# Patient Record
Sex: MALE | Race: BLACK OR AFRICAN AMERICAN
[De-identification: names, ages, dates, MRNs, and addresses within clinical notes are randomized per-mention and may not be internally consistent; named-entity substitution may affect disease eponyms.]

---

## 2017-01-17 PROBLEM — M54.5 LUMBAGO: Status: ACTIVE | Noted: 2017-01-17

## 2017-01-18 ENCOUNTER — APPOINTMENT (OUTPATIENT)
Dept: ORTHOPEDIC SURGERY | Facility: CLINIC | Age: 57
End: 2017-01-18

## 2017-01-18 DIAGNOSIS — M54.5 LOW BACK PAIN: ICD-10-CM

## 2017-12-01 ENCOUNTER — EMERGENCY (EMERGENCY)
Facility: HOSPITAL | Age: 57
LOS: 0 days | Discharge: HOME | End: 2017-12-01
Attending: EMERGENCY MEDICINE | Admitting: EMERGENCY MEDICINE
Payer: MEDICAID

## 2017-12-01 VITALS
DIASTOLIC BLOOD PRESSURE: 86 MMHG | RESPIRATION RATE: 19 BRPM | SYSTOLIC BLOOD PRESSURE: 188 MMHG | OXYGEN SATURATION: 100 % | HEART RATE: 75 BPM | TEMPERATURE: 97 F

## 2017-12-01 VITALS — WEIGHT: 175.05 LBS | HEIGHT: 69 IN

## 2017-12-01 DIAGNOSIS — M79.89 OTHER SPECIFIED SOFT TISSUE DISORDERS: ICD-10-CM

## 2017-12-01 DIAGNOSIS — I10 ESSENTIAL (PRIMARY) HYPERTENSION: ICD-10-CM

## 2017-12-01 DIAGNOSIS — E78.5 HYPERLIPIDEMIA, UNSPECIFIED: ICD-10-CM

## 2017-12-01 DIAGNOSIS — M79.622 PAIN IN LEFT UPPER ARM: ICD-10-CM

## 2017-12-01 DIAGNOSIS — M25.512 PAIN IN LEFT SHOULDER: ICD-10-CM

## 2017-12-01 DIAGNOSIS — R60.9 EDEMA, UNSPECIFIED: ICD-10-CM

## 2017-12-01 DIAGNOSIS — M79.602 PAIN IN LEFT ARM: ICD-10-CM

## 2017-12-01 LAB
ALBUMIN SERPL ELPH-MCNC: 3.7 G/DL — SIGNIFICANT CHANGE UP (ref 3.3–5)
ALP SERPL-CCNC: 110 U/L — SIGNIFICANT CHANGE UP (ref 40–120)
ALT FLD-CCNC: 40 U/L — SIGNIFICANT CHANGE UP (ref 12–78)
ANION GAP SERPL CALC-SCNC: 6 MMOL/L — SIGNIFICANT CHANGE UP (ref 5–17)
APTT BLD: 32.5 SEC — SIGNIFICANT CHANGE UP (ref 27.5–37.4)
AST SERPL-CCNC: 22 U/L — SIGNIFICANT CHANGE UP (ref 15–37)
BASOPHILS # BLD AUTO: 0.1 K/UL — SIGNIFICANT CHANGE UP (ref 0–0.2)
BASOPHILS NFR BLD AUTO: 0.8 % — SIGNIFICANT CHANGE UP (ref 0–2)
BILIRUB SERPL-MCNC: 0.6 MG/DL — SIGNIFICANT CHANGE UP (ref 0.2–1.2)
BUN SERPL-MCNC: 8 MG/DL — SIGNIFICANT CHANGE UP (ref 7–23)
CALCIUM SERPL-MCNC: 9.2 MG/DL — SIGNIFICANT CHANGE UP (ref 8.5–10.1)
CHLORIDE SERPL-SCNC: 106 MMOL/L — SIGNIFICANT CHANGE UP (ref 96–108)
CO2 SERPL-SCNC: 27 MMOL/L — SIGNIFICANT CHANGE UP (ref 22–31)
CREAT SERPL-MCNC: 1.11 MG/DL — SIGNIFICANT CHANGE UP (ref 0.5–1.3)
EOSINOPHIL # BLD AUTO: 0.1 K/UL — SIGNIFICANT CHANGE UP (ref 0–0.5)
EOSINOPHIL NFR BLD AUTO: 2 % — SIGNIFICANT CHANGE UP (ref 0–6)
GLUCOSE SERPL-MCNC: 111 MG/DL — HIGH (ref 70–99)
HCT VFR BLD CALC: 39.7 % — SIGNIFICANT CHANGE UP (ref 39–50)
HGB BLD-MCNC: 12.9 G/DL — LOW (ref 13–17)
INR BLD: 1.1 RATIO — SIGNIFICANT CHANGE UP (ref 0.88–1.16)
LACTATE SERPL-SCNC: 1 MMOL/L — SIGNIFICANT CHANGE UP (ref 0.7–2)
LYMPHOCYTES # BLD AUTO: 1.6 K/UL — SIGNIFICANT CHANGE UP (ref 1–3.3)
LYMPHOCYTES # BLD AUTO: 21.1 % — SIGNIFICANT CHANGE UP (ref 13–44)
MCHC RBC-ENTMCNC: 29.1 PG — SIGNIFICANT CHANGE UP (ref 27–34)
MCHC RBC-ENTMCNC: 32.6 GM/DL — SIGNIFICANT CHANGE UP (ref 32–36)
MCV RBC AUTO: 89.5 FL — SIGNIFICANT CHANGE UP (ref 80–100)
MONOCYTES # BLD AUTO: 0.7 K/UL — SIGNIFICANT CHANGE UP (ref 0–0.9)
MONOCYTES NFR BLD AUTO: 9.4 % — SIGNIFICANT CHANGE UP (ref 2–14)
NEUTROPHILS # BLD AUTO: 5 K/UL — SIGNIFICANT CHANGE UP (ref 1.8–7.4)
NEUTROPHILS NFR BLD AUTO: 66.7 % — SIGNIFICANT CHANGE UP (ref 43–77)
PLATELET # BLD AUTO: 196 K/UL — SIGNIFICANT CHANGE UP (ref 150–400)
POTASSIUM SERPL-MCNC: 4 MMOL/L — SIGNIFICANT CHANGE UP (ref 3.5–5.3)
POTASSIUM SERPL-SCNC: 4 MMOL/L — SIGNIFICANT CHANGE UP (ref 3.5–5.3)
PROT SERPL-MCNC: 7.5 GM/DL — SIGNIFICANT CHANGE UP (ref 6–8.3)
PROTHROM AB SERPL-ACNC: 11.9 SEC — SIGNIFICANT CHANGE UP (ref 9.8–12.7)
RBC # BLD: 4.44 M/UL — SIGNIFICANT CHANGE UP (ref 4.2–5.8)
RBC # FLD: 12.4 % — SIGNIFICANT CHANGE UP (ref 10.3–14.5)
SODIUM SERPL-SCNC: 139 MMOL/L — SIGNIFICANT CHANGE UP (ref 135–145)
WBC # BLD: 7.5 K/UL — SIGNIFICANT CHANGE UP (ref 3.8–10.5)
WBC # FLD AUTO: 7.5 K/UL — SIGNIFICANT CHANGE UP (ref 3.8–10.5)

## 2017-12-01 PROCEDURE — 93971 EXTREMITY STUDY: CPT | Mod: 26,LT

## 2017-12-01 PROCEDURE — 99285 EMERGENCY DEPT VISIT HI MDM: CPT | Mod: 25

## 2017-12-01 PROCEDURE — 73030 X-RAY EXAM OF SHOULDER: CPT | Mod: 26,LT

## 2017-12-01 RX ORDER — VANCOMYCIN HCL 1 G
1000 VIAL (EA) INTRAVENOUS ONCE
Qty: 0 | Refills: 0 | Status: COMPLETED | OUTPATIENT
Start: 2017-12-01 | End: 2017-12-01

## 2017-12-01 RX ORDER — SODIUM CHLORIDE 9 MG/ML
1000 INJECTION INTRAMUSCULAR; INTRAVENOUS; SUBCUTANEOUS ONCE
Qty: 0 | Refills: 0 | Status: COMPLETED | OUTPATIENT
Start: 2017-12-01 | End: 2017-12-01

## 2017-12-01 RX ORDER — CEPHALEXIN 500 MG
1 CAPSULE ORAL
Qty: 40 | Refills: 0
Start: 2017-12-01 | End: 2017-12-11

## 2017-12-01 RX ADMIN — Medication 250 MILLIGRAM(S): at 12:05

## 2017-12-01 RX ADMIN — SODIUM CHLORIDE 2000 MILLILITER(S): 9 INJECTION INTRAMUSCULAR; INTRAVENOUS; SUBCUTANEOUS at 11:09

## 2017-12-01 NOTE — ED ADULT NURSE REASSESSMENT NOTE - NS ED NURSE REASSESS COMMENT FT1
received pt from KEIKO Daigle.pt is resting in bed comfortably at this time
pt is awaiting dispo. Dr. Pacheco notified.

## 2017-12-01 NOTE — ED PROVIDER NOTE - MEDICAL DECISION MAKING DETAILS
57M pmhx HTN, HLD presents to ED c/o left upper extremity edema and pain. Plan IV fluids, IV abx, shoulder x-ray, reassess.
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
## 2017-12-01 NOTE — CONSULT NOTE ADULT - PROBLEM SELECTOR RECOMMENDATION 9
Likely 2/2 abrasion with normal healing, unlikely cellulitis   -no leukocytosis, fevers, significant erythema or warmth  -swelling likely post-tramautic, unlikely infectious in origin  -no apparent fractures on x-ray  -recommend oral antibiotic (keflex an option), normal wound care  -stable for discharge from ED, follow-up with PCP within 1 week. Return if symptoms worsen.
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
## 2017-12-01 NOTE — ED PROVIDER NOTE - PROGRESS NOTE DETAILS
Hospitalist would like a LUE duplex which is pending. Hospitalist would Hospitalist has seen and consulted on the pt.  If US is negative, can discharge with abx with f/u.

## 2017-12-06 LAB
CULTURE RESULTS: SIGNIFICANT CHANGE UP
CULTURE RESULTS: SIGNIFICANT CHANGE UP
SPECIMEN SOURCE: SIGNIFICANT CHANGE UP
SPECIMEN SOURCE: SIGNIFICANT CHANGE UP

## 2018-11-27 ENCOUNTER — HOSPITAL ENCOUNTER (EMERGENCY)
Age: 58
Discharge: HOME | End: 2018-11-27
Attending: EMERGENCY MEDICINE
Payer: MEDICAID

## 2018-11-27 ENCOUNTER — APPOINTMENT (OUTPATIENT)
Dept: ULTRASOUND IMAGING | Age: 58
End: 2018-11-27
Attending: PHYSICIAN ASSISTANT
Payer: MEDICAID

## 2018-11-27 VITALS
HEIGHT: 70 IN | OXYGEN SATURATION: 96 % | DIASTOLIC BLOOD PRESSURE: 84 MMHG | RESPIRATION RATE: 18 BRPM | TEMPERATURE: 98.2 F | BODY MASS INDEX: 25.48 KG/M2 | WEIGHT: 178 LBS | HEART RATE: 88 BPM | SYSTOLIC BLOOD PRESSURE: 170 MMHG

## 2018-11-27 DIAGNOSIS — N45.3 EPIDIDYMOORCHITIS: Primary | ICD-10-CM

## 2018-11-27 DIAGNOSIS — N50.811 TESTICULAR PAIN, RIGHT: ICD-10-CM

## 2018-11-27 LAB
APPEARANCE UR: CLEAR
BILIRUB UR QL: NEGATIVE
COLOR UR: YELLOW
GLUCOSE UR STRIP.AUTO-MCNC: NEGATIVE MG/DL
HGB UR QL STRIP: NEGATIVE
KETONES UR QL STRIP.AUTO: ABNORMAL MG/DL
LEUKOCYTE ESTERASE UR QL STRIP.AUTO: ABNORMAL
NITRITE UR QL STRIP.AUTO: NEGATIVE
PH UR STRIP: 6.5 [PH] (ref 5–8)
PROT UR STRIP-MCNC: NEGATIVE MG/DL
RBC #/AREA URNS HPF: NORMAL /HPF (ref 0–5)
SP GR UR REFRACTOMETRY: 1.03 (ref 1–1.03)
UROBILINOGEN UR QL STRIP.AUTO: 1 EU/DL (ref 0.2–1)
WBC URNS QL MICRO: NORMAL /HPF (ref 0–4)

## 2018-11-27 PROCEDURE — 76870 US EXAM SCROTUM: CPT

## 2018-11-27 PROCEDURE — 74011250636 HC RX REV CODE- 250/636: Performed by: PHYSICIAN ASSISTANT

## 2018-11-27 PROCEDURE — 81001 URINALYSIS AUTO W/SCOPE: CPT

## 2018-11-27 PROCEDURE — 99284 EMERGENCY DEPT VISIT MOD MDM: CPT

## 2018-11-27 PROCEDURE — 96372 THER/PROPH/DIAG INJ SC/IM: CPT

## 2018-11-27 PROCEDURE — 87491 CHLMYD TRACH DNA AMP PROBE: CPT

## 2018-11-27 RX ORDER — KETOROLAC TROMETHAMINE 30 MG/ML
60 INJECTION, SOLUTION INTRAMUSCULAR; INTRAVENOUS
Status: COMPLETED | OUTPATIENT
Start: 2018-11-27 | End: 2018-11-27

## 2018-11-27 RX ORDER — DOXYCYCLINE HYCLATE 100 MG
100 TABLET ORAL 2 TIMES DAILY
Qty: 28 TAB | Refills: 0 | Status: SHIPPED | OUTPATIENT
Start: 2018-11-27 | End: 2018-12-11

## 2018-11-27 RX ORDER — NAPROXEN 500 MG/1
500 TABLET ORAL 2 TIMES DAILY WITH MEALS
Qty: 20 TAB | Refills: 0 | Status: SHIPPED | OUTPATIENT
Start: 2018-11-27 | End: 2018-12-07

## 2018-11-27 RX ADMIN — LIDOCAINE HYDROCHLORIDE 250 MG: 10 INJECTION, SOLUTION EPIDURAL; INFILTRATION; INTRACAUDAL; PERINEURAL at 17:28

## 2018-11-27 RX ADMIN — KETOROLAC TROMETHAMINE 60 MG: 30 INJECTION, SOLUTION INTRAMUSCULAR at 16:31
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
## 2018-11-27 NOTE — DISCHARGE INSTRUCTIONS
Epididymitis and Orchitis: Care Instructions  Your Care Instructions    Epididymitis is pain and swelling of the tube that is attached to each testicle. This tube is called the epididymis. Orchitis is pain and swelling of the testicle. Infection with bacteria often causes these conditions. Sexually transmitted infections (STIs) also can cause both conditions. This is often the case in men younger than 28. Other causes in boys and older men are infections from surgery or having a catheter that drains urine. The mumps virus also can cause orchitis. Anti-inflammatory or pain medicines can help with the pain. Antibiotics are used if the problem is caused by bacteria. They are not used if a virus is the cause. Your testicle may stay swollen for many days or even a few weeks. The doctor has checked you carefully, but problems can develop later. If you notice any problems or new symptoms, get medical treatment right away. Follow-up care is a key part of your treatment and safety. Be sure to make and go to all appointments, and call your doctor if you are having problems. It's also a good idea to know your test results and keep a list of the medicines you take. How can you care for yourself at home? · If your doctor prescribed antibiotics, take them as directed. Do not stop taking them just because you feel better. You need to take the full course of antibiotics. · Ask your doctor if you can take an over-the-counter pain medicine, such as acetaminophen (Tylenol), ibuprofen (Advil, Motrin), or naproxen (Aleve). Be safe with medicines. Read and follow all instructions on the label. · Limit your activity to what is comfortable. · Wear snug underwear or an athletic supporter. This can help reduce pain. · Apply either cold or heat to the swollen area. Use the one that works best for your pain. Sitting in a warm bath for 15 minutes twice a day will help reduce the swelling more quickly.   · If you have been told that an STI may have caused your condition, do not have sex until your doctor says it is safe. This will prevent spreading the infection. Tell your sex partner or partners that they need to be checked. They may need treatment. When should you call for help? Call your doctor now or seek immediate medical care if:    · Your pain gets worse.     · You have a new or higher fever.     · You have new or more swelling of your testicle.     · You have new belly pain, or your pain gets worse.    Watch closely for changes in your health, and be sure to contact your doctor if:    · You do not get better as expected. Where can you learn more? Go to http://luciano-nicola.info/. Enter S360 in the search box to learn more about \"Epididymitis and Orchitis: Care Instructions. \"  Current as of: March 21, 2018  Content Version: 11.8  © 4312-2185 [employer]. Care instructions adapted under license by [employer] (which disclaims liability or warranty for this information). If you have questions about a medical condition or this instruction, always ask your healthcare professional. [place] [address] any warranty or liability for your use of this information. Testicular Pain: Care Instructions  Your Care Instructions    Pain in the testicles can be caused by many things. These include an injury to your testicles, an infection, and testicular torsion. Injuries and genital problems most often happen during sports or recreational activities, at work, or in a fall. Pain caused by an injury usually goes away quickly. There is usually no long-term harm to your testicles. Infections that may cause pain include:  · An infection of the testicles. This is called orchitis. · An abscess in the scrotum or testicles. · Some sexually transmitted infections (STIs). · A swelling of the tube attached to a testicle. This swelling is called epididymitis.  It can cause pain and is sometimes caused by an infection. Testicular torsion happens when a testicle twists on the spermatic cord. This cuts off the blood supply to the testicle. This is a serious condition that requires surgery. Follow-up care is a key part of your treatment and safety. Be sure to make and go to all appointments, and call your doctor if you are having problems. It's also a good idea to know your test results and keep a list of the medicines you take. How can you care for yourself at home? · Rest and protect your testicles and groin. Stop, change, or take a break from any activity that may be causing your pain or soreness. · Put ice or a cold pack on the area for 10 to 20 minutes at a time. Put a thin cloth between the ice and your skin. · Wear briefs, not boxers. Briefs help support the injured area. You can use a jock strap if it helps relieve your pain. · If your doctor prescribed antibiotics, take them as directed. Do not stop taking them just because you feel better. You need to take the full course of antibiotics. · Ask your doctor if you can take an over-the-counter pain medicine, such as acetaminophen (Tylenol), ibuprofen (Advil, Motrin), or naproxen (Aleve). Be safe with medicines. Read and follow all instructions on the label. · If the doctor gave you a prescription medicine for pain, take it as prescribed. When should you call for help? Call your doctor now or seek immediate medical care if:    · You have severe or increasing pain.     · You notice a change in how your testicles look or are positioned in your scrotum.     · You notice new or worse swelling in your scrotum.     · You have symptoms of a urinary problem, such as a urinary tract infection. These may include:  ? Pain or burning when you urinate. ? A frequent need to urinate without being able to pass much urine. ? Pain in the flank, which is just below the rib cage and above the waist on either side of the back. ?  Blood in your urine.  ? A fever.    Watch closely for changes in your health, and be sure to contact your doctor if:    · You do not get better as expected. Where can you learn more? Go to http://luciano-nicola.info/. Enter M863 in the search box to learn more about \"Testicular Pain: Care Instructions. \"  Current as of: March 21, 2018  Content Version: 11.8  © 0359-6290 [employer]. Care instructions adapted under license by [employer] (which disclaims liability or warranty for this information). If you have questions about a medical condition or this instruction, always ask your healthcare professional. [address] any warranty or liability for your use of this information.

## 2018-11-27 NOTE — ED NOTES
Current Discharge Medication List  
  
START taking these medications Details  
doxycycline (VIBRA-TABS) 100 mg tablet Take 1 Tab by mouth two (2) times a day for 14 days. Qty: 28 Tab, Refills: 0  
  
naproxen (NAPROSYN) 500 mg tablet Take 1 Tab by mouth two (2) times daily (with meals) for 10 days. Qty: 20 Tab, Refills: 0 Patient armband removed and shredded. Prescriptions given and reviewed with patient.
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
## 2018-11-27 NOTE — ED NOTES
I performed a brief evaluation, including history and physical, of the patient here in triage and I have determined that pt will need further treatment and evaluation from the main side ER physician. I have placed initial orders to help in expediting patients care. November 27, 2018 at 3:07 PM - [place], [place] Visit Vitals /86 (BP 1 Location: Left arm) Pulse 88 Temp 98.2 °F (36.8 °C) Resp 18 Ht 5' 9.5\" (1.765 m) Wt 80.7 kg (178 lb) SpO2 96% BMI 25.91 kg/m²

## 2018-11-28 LAB
C TRACH RRNA SPEC QL NAA+PROBE: NEGATIVE
N GONORRHOEA RRNA SPEC QL NAA+PROBE: NEGATIVE
SPECIMEN SOURCE: NORMAL

## 2019-12-04 ENCOUNTER — EMERGENCY (EMERGENCY)
Facility: HOSPITAL | Age: 59
LOS: 0 days | Discharge: HOME | End: 2019-12-04
Attending: EMERGENCY MEDICINE
Payer: MEDICAID

## 2019-12-04 VITALS — WEIGHT: 175.05 LBS | HEIGHT: 69 IN

## 2019-12-04 VITALS
OXYGEN SATURATION: 96 % | HEART RATE: 64 BPM | RESPIRATION RATE: 18 BRPM | SYSTOLIC BLOOD PRESSURE: 172 MMHG | TEMPERATURE: 98 F | DIASTOLIC BLOOD PRESSURE: 91 MMHG

## 2019-12-04 DIAGNOSIS — E78.5 HYPERLIPIDEMIA, UNSPECIFIED: ICD-10-CM

## 2019-12-04 DIAGNOSIS — R10.31 RIGHT LOWER QUADRANT PAIN: ICD-10-CM

## 2019-12-04 DIAGNOSIS — R10.11 RIGHT UPPER QUADRANT PAIN: ICD-10-CM

## 2019-12-04 DIAGNOSIS — Z98.890 OTHER SPECIFIED POSTPROCEDURAL STATES: Chronic | ICD-10-CM

## 2019-12-04 DIAGNOSIS — I10 ESSENTIAL (PRIMARY) HYPERTENSION: ICD-10-CM

## 2019-12-04 LAB
ALBUMIN SERPL ELPH-MCNC: 3.8 G/DL — SIGNIFICANT CHANGE UP (ref 3.3–5)
ALP SERPL-CCNC: 89 U/L — SIGNIFICANT CHANGE UP (ref 40–120)
ALT FLD-CCNC: 38 U/L — SIGNIFICANT CHANGE UP (ref 12–78)
ANION GAP SERPL CALC-SCNC: 5 MMOL/L — SIGNIFICANT CHANGE UP (ref 5–17)
APPEARANCE UR: CLEAR — SIGNIFICANT CHANGE UP
APTT BLD: 32.4 SEC — SIGNIFICANT CHANGE UP (ref 27.5–36.3)
AST SERPL-CCNC: 48 U/L — HIGH (ref 15–37)
BASOPHILS # BLD AUTO: 0.03 K/UL — SIGNIFICANT CHANGE UP (ref 0–0.2)
BASOPHILS NFR BLD AUTO: 0.5 % — SIGNIFICANT CHANGE UP (ref 0–2)
BILIRUB SERPL-MCNC: 0.6 MG/DL — SIGNIFICANT CHANGE UP (ref 0.2–1.2)
BILIRUB UR-MCNC: NEGATIVE — SIGNIFICANT CHANGE UP
BUN SERPL-MCNC: 17 MG/DL — SIGNIFICANT CHANGE UP (ref 7–23)
CALCIUM SERPL-MCNC: 8.8 MG/DL — SIGNIFICANT CHANGE UP (ref 8.5–10.1)
CHLORIDE SERPL-SCNC: 110 MMOL/L — HIGH (ref 96–108)
CO2 SERPL-SCNC: 24 MMOL/L — SIGNIFICANT CHANGE UP (ref 22–31)
COLOR SPEC: YELLOW — SIGNIFICANT CHANGE UP
CREAT SERPL-MCNC: 1.35 MG/DL — HIGH (ref 0.5–1.3)
DIFF PNL FLD: NEGATIVE — SIGNIFICANT CHANGE UP
EOSINOPHIL # BLD AUTO: 0.07 K/UL — SIGNIFICANT CHANGE UP (ref 0–0.5)
EOSINOPHIL NFR BLD AUTO: 1.2 % — SIGNIFICANT CHANGE UP (ref 0–6)
GLUCOSE SERPL-MCNC: 90 MG/DL — SIGNIFICANT CHANGE UP (ref 70–99)
GLUCOSE UR QL: NEGATIVE MG/DL — SIGNIFICANT CHANGE UP
HCT VFR BLD CALC: 42.9 % — SIGNIFICANT CHANGE UP (ref 39–50)
HGB BLD-MCNC: 14.3 G/DL — SIGNIFICANT CHANGE UP (ref 13–17)
IMM GRANULOCYTES NFR BLD AUTO: 0.2 % — SIGNIFICANT CHANGE UP (ref 0–1.5)
INR BLD: 1.06 RATIO — SIGNIFICANT CHANGE UP (ref 0.88–1.16)
KETONES UR-MCNC: NEGATIVE — SIGNIFICANT CHANGE UP
LEUKOCYTE ESTERASE UR-ACNC: NEGATIVE — SIGNIFICANT CHANGE UP
LIDOCAIN IGE QN: 102 U/L — SIGNIFICANT CHANGE UP (ref 73–393)
LYMPHOCYTES # BLD AUTO: 1.59 K/UL — SIGNIFICANT CHANGE UP (ref 1–3.3)
LYMPHOCYTES # BLD AUTO: 26.4 % — SIGNIFICANT CHANGE UP (ref 13–44)
MCHC RBC-ENTMCNC: 30.2 PG — SIGNIFICANT CHANGE UP (ref 27–34)
MCHC RBC-ENTMCNC: 33.3 GM/DL — SIGNIFICANT CHANGE UP (ref 32–36)
MCV RBC AUTO: 90.5 FL — SIGNIFICANT CHANGE UP (ref 80–100)
MONOCYTES # BLD AUTO: 0.61 K/UL — SIGNIFICANT CHANGE UP (ref 0–0.9)
MONOCYTES NFR BLD AUTO: 10.1 % — SIGNIFICANT CHANGE UP (ref 2–14)
NEUTROPHILS # BLD AUTO: 3.71 K/UL — SIGNIFICANT CHANGE UP (ref 1.8–7.4)
NEUTROPHILS NFR BLD AUTO: 61.6 % — SIGNIFICANT CHANGE UP (ref 43–77)
NITRITE UR-MCNC: NEGATIVE — SIGNIFICANT CHANGE UP
PH UR: 5 — SIGNIFICANT CHANGE UP (ref 5–8)
PLATELET # BLD AUTO: 252 K/UL — SIGNIFICANT CHANGE UP (ref 150–400)
POTASSIUM SERPL-MCNC: 4 MMOL/L — SIGNIFICANT CHANGE UP (ref 3.5–5.3)
POTASSIUM SERPL-SCNC: 4 MMOL/L — SIGNIFICANT CHANGE UP (ref 3.5–5.3)
PROT SERPL-MCNC: 7.7 GM/DL — SIGNIFICANT CHANGE UP (ref 6–8.3)
PROT UR-MCNC: 15 MG/DL
PROTHROM AB SERPL-ACNC: 11.8 SEC — SIGNIFICANT CHANGE UP (ref 10–12.9)
RBC # BLD: 4.74 M/UL — SIGNIFICANT CHANGE UP (ref 4.2–5.8)
RBC # FLD: 13.4 % — SIGNIFICANT CHANGE UP (ref 10.3–14.5)
SODIUM SERPL-SCNC: 139 MMOL/L — SIGNIFICANT CHANGE UP (ref 135–145)
SP GR SPEC: 1.02 — SIGNIFICANT CHANGE UP (ref 1.01–1.02)
UROBILINOGEN FLD QL: NEGATIVE MG/DL — SIGNIFICANT CHANGE UP
WBC # BLD: 6.02 K/UL — SIGNIFICANT CHANGE UP (ref 3.8–10.5)
WBC # FLD AUTO: 6.02 K/UL — SIGNIFICANT CHANGE UP (ref 3.8–10.5)

## 2019-12-04 PROCEDURE — 36415 COLL VENOUS BLD VENIPUNCTURE: CPT

## 2019-12-04 PROCEDURE — 85730 THROMBOPLASTIN TIME PARTIAL: CPT

## 2019-12-04 PROCEDURE — 86900 BLOOD TYPING SEROLOGIC ABO: CPT

## 2019-12-04 PROCEDURE — 83690 ASSAY OF LIPASE: CPT

## 2019-12-04 PROCEDURE — 96375 TX/PRO/DX INJ NEW DRUG ADDON: CPT

## 2019-12-04 PROCEDURE — 86850 RBC ANTIBODY SCREEN: CPT

## 2019-12-04 PROCEDURE — 80053 COMPREHEN METABOLIC PANEL: CPT

## 2019-12-04 PROCEDURE — 81001 URINALYSIS AUTO W/SCOPE: CPT

## 2019-12-04 PROCEDURE — 99284 EMERGENCY DEPT VISIT MOD MDM: CPT

## 2019-12-04 PROCEDURE — 85025 COMPLETE CBC W/AUTO DIFF WBC: CPT

## 2019-12-04 PROCEDURE — 87086 URINE CULTURE/COLONY COUNT: CPT

## 2019-12-04 PROCEDURE — 74177 CT ABD & PELVIS W/CONTRAST: CPT

## 2019-12-04 PROCEDURE — 96374 THER/PROPH/DIAG INJ IV PUSH: CPT | Mod: XU

## 2019-12-04 PROCEDURE — 99284 EMERGENCY DEPT VISIT MOD MDM: CPT | Mod: 25

## 2019-12-04 PROCEDURE — 86901 BLOOD TYPING SEROLOGIC RH(D): CPT

## 2019-12-04 PROCEDURE — 74177 CT ABD & PELVIS W/CONTRAST: CPT | Mod: 26

## 2019-12-04 PROCEDURE — 85610 PROTHROMBIN TIME: CPT

## 2019-12-04 RX ORDER — SODIUM CHLORIDE 9 MG/ML
1000 INJECTION INTRAMUSCULAR; INTRAVENOUS; SUBCUTANEOUS ONCE
Refills: 0 | Status: COMPLETED | OUTPATIENT
Start: 2019-12-04 | End: 2019-12-04

## 2019-12-04 RX ORDER — ONDANSETRON 8 MG/1
4 TABLET, FILM COATED ORAL ONCE
Refills: 0 | Status: COMPLETED | OUTPATIENT
Start: 2019-12-04 | End: 2019-12-04

## 2019-12-04 RX ORDER — MORPHINE SULFATE 50 MG/1
4 CAPSULE, EXTENDED RELEASE ORAL ONCE
Refills: 0 | Status: DISCONTINUED | OUTPATIENT
Start: 2019-12-04 | End: 2019-12-04

## 2019-12-04 RX ADMIN — MORPHINE SULFATE 4 MILLIGRAM(S): 50 CAPSULE, EXTENDED RELEASE ORAL at 12:57

## 2019-12-04 RX ADMIN — ONDANSETRON 4 MILLIGRAM(S): 8 TABLET, FILM COATED ORAL at 12:57

## 2019-12-04 RX ADMIN — SODIUM CHLORIDE 1000 MILLILITER(S): 9 INJECTION INTRAMUSCULAR; INTRAVENOUS; SUBCUTANEOUS at 12:57

## 2019-12-04 NOTE — ED STATDOCS - PROGRESS NOTE DETAILS
Patient initially seen and evaluated with ED attending at intake.  Reporting abdominal pain intermittent for a few weeks, worse today, he is visiting from [facility].  No acute findings on workup.  REviewed PMD f/u, GI follow up and return precautions -Hermann Ngo PA-C

## 2019-12-04 NOTE — ED STATDOCS - CLINICAL SUMMARY MEDICAL DECISION MAKING FREE TEXT BOX
patient with abdominal pain, labs, CT, urine    No acute findings, possibly muscle strain, recommended GI f/u

## 2019-12-04 NOTE — ED ADULT NURSE NOTE - OBJECTIVE STATEMENT
Pt presents to ED c/o right sided abd pain starting a few weeks intermittently worse over the last few weeks, constantly for the last 2 days. Pt denies N/V/D, fever. Pt reports last BM this morning.

## 2019-12-04 NOTE — ED ADULT NURSE NOTE - NSIMPLEMENTINTERV_GEN_ALL_ED
Implemented All Universal Safety Interventions:  [place] to call system. Call bell, personal items and telephone within reach. Instruct patient to call for assistance. Room bathroom lighting operational. Non-slip footwear when patient is off stretcher. Physically safe environment: no spills, clutter or unnecessary equipment. Stretcher in lowest position, wheels locked, appropriate side rails in place.

## 2019-12-04 NOTE — ED STATDOCS - CARE PROVIDER_API CALL
Roberto Blackmon)  Gastroenterology  [address] [place]  [place] [address]  Phone: [phone]  Fax: [phone]  Follow Up Time:

## 2019-12-04 NOTE — ED STATDOCS - GASTROINTESTINAL, MLM
abdomen soft, and non-distended. Bowel sounds present. +RLQ, suprapubic, and LLQ TTP. +voluntary guarding with pain.

## 2019-12-04 NOTE — ED STATDOCS - PATIENT PORTAL LINK FT
You can access the FollowMyHealth Patient Portal offered by [facility] by registering at the following website: [email or web address]. By joining [facility]’s FollowMyHealth portal, you will also be able to view your health information using other applications (apps) compatible with our system.

## 2019-12-04 NOTE — ED STATDOCS - OBJECTIVE STATEMENT
58 y/o male with a PMHx of HTN, HLD, "back surgery" presents to the ED c/o right sided abd pain for weeks, described as pressure. Denies fever, chills, dysuria. Not worse with eating. No h/o abdominal surgeries. NKDA. 60 y/o male with a PMHx of HTN, HLD, "back surgery" presents to the ED c/o right sided abd pain for weeks, described as pressure. Denies fever, chills, dysuria, n/v/d. Not worse with eating. No h/o abdominal surgeries. NKDA.

## 2019-12-06 LAB
CULTURE RESULTS: SIGNIFICANT CHANGE UP
SPECIMEN SOURCE: SIGNIFICANT CHANGE UP

## 2019-12-09 ENCOUNTER — EMERGENCY (EMERGENCY)
Facility: HOSPITAL | Age: 59
LOS: 0 days | Discharge: HOME | End: 2019-12-09
Attending: EMERGENCY MEDICINE
Payer: MEDICAID

## 2019-12-09 VITALS
DIASTOLIC BLOOD PRESSURE: 87 MMHG | HEIGHT: 69 IN | TEMPERATURE: 98 F | OXYGEN SATURATION: 98 % | SYSTOLIC BLOOD PRESSURE: 168 MMHG | WEIGHT: 175.05 LBS | RESPIRATION RATE: 16 BRPM | HEART RATE: 65 BPM

## 2019-12-09 VITALS
RESPIRATION RATE: 17 BRPM | OXYGEN SATURATION: 99 % | SYSTOLIC BLOOD PRESSURE: 145 MMHG | HEART RATE: 66 BPM | DIASTOLIC BLOOD PRESSURE: 68 MMHG | TEMPERATURE: 98 F

## 2019-12-09 DIAGNOSIS — N34.2 OTHER URETHRITIS: ICD-10-CM

## 2019-12-09 DIAGNOSIS — E78.5 HYPERLIPIDEMIA, UNSPECIFIED: ICD-10-CM

## 2019-12-09 DIAGNOSIS — R10.30 LOWER ABDOMINAL PAIN, UNSPECIFIED: ICD-10-CM

## 2019-12-09 DIAGNOSIS — F17.200 NICOTINE DEPENDENCE, UNSPECIFIED, UNCOMPLICATED: ICD-10-CM

## 2019-12-09 DIAGNOSIS — I10 ESSENTIAL (PRIMARY) HYPERTENSION: ICD-10-CM

## 2019-12-09 DIAGNOSIS — Z98.890 OTHER SPECIFIED POSTPROCEDURAL STATES: Chronic | ICD-10-CM

## 2019-12-09 LAB
ALBUMIN SERPL ELPH-MCNC: 3.5 G/DL — SIGNIFICANT CHANGE UP (ref 3.3–5)
ALP SERPL-CCNC: 85 U/L — SIGNIFICANT CHANGE UP (ref 40–120)
ALT FLD-CCNC: 36 U/L — SIGNIFICANT CHANGE UP (ref 12–78)
ANION GAP SERPL CALC-SCNC: 6 MMOL/L — SIGNIFICANT CHANGE UP (ref 5–17)
APPEARANCE UR: CLEAR — SIGNIFICANT CHANGE UP
AST SERPL-CCNC: 30 U/L — SIGNIFICANT CHANGE UP (ref 15–37)
BASOPHILS # BLD AUTO: 0.02 K/UL — SIGNIFICANT CHANGE UP (ref 0–0.2)
BASOPHILS NFR BLD AUTO: 0.5 % — SIGNIFICANT CHANGE UP (ref 0–2)
BILIRUB SERPL-MCNC: 0.8 MG/DL — SIGNIFICANT CHANGE UP (ref 0.2–1.2)
BILIRUB UR-MCNC: NEGATIVE — SIGNIFICANT CHANGE UP
BUN SERPL-MCNC: 8 MG/DL — SIGNIFICANT CHANGE UP (ref 7–23)
CALCIUM SERPL-MCNC: 9.6 MG/DL — SIGNIFICANT CHANGE UP (ref 8.5–10.1)
CHLORIDE SERPL-SCNC: 106 MMOL/L — SIGNIFICANT CHANGE UP (ref 96–108)
CO2 SERPL-SCNC: 25 MMOL/L — SIGNIFICANT CHANGE UP (ref 22–31)
COLOR SPEC: YELLOW — SIGNIFICANT CHANGE UP
CREAT SERPL-MCNC: 1.16 MG/DL — SIGNIFICANT CHANGE UP (ref 0.5–1.3)
DIFF PNL FLD: NEGATIVE — SIGNIFICANT CHANGE UP
EOSINOPHIL # BLD AUTO: 0.05 K/UL — SIGNIFICANT CHANGE UP (ref 0–0.5)
EOSINOPHIL NFR BLD AUTO: 1.2 % — SIGNIFICANT CHANGE UP (ref 0–6)
GLUCOSE SERPL-MCNC: 110 MG/DL — HIGH (ref 70–99)
GLUCOSE UR QL: NEGATIVE MG/DL — SIGNIFICANT CHANGE UP
HCT VFR BLD CALC: 43.3 % — SIGNIFICANT CHANGE UP (ref 39–50)
HGB BLD-MCNC: 14.2 G/DL — SIGNIFICANT CHANGE UP (ref 13–17)
HIV 1 & 2 AB SERPL IA.RAPID: SIGNIFICANT CHANGE UP
IMM GRANULOCYTES NFR BLD AUTO: 0.2 % — SIGNIFICANT CHANGE UP (ref 0–1.5)
KETONES UR-MCNC: NEGATIVE — SIGNIFICANT CHANGE UP
LEUKOCYTE ESTERASE UR-ACNC: NEGATIVE — SIGNIFICANT CHANGE UP
LIDOCAIN IGE QN: 96 U/L — SIGNIFICANT CHANGE UP (ref 73–393)
LYMPHOCYTES # BLD AUTO: 0.9 K/UL — LOW (ref 1–3.3)
LYMPHOCYTES # BLD AUTO: 21.2 % — SIGNIFICANT CHANGE UP (ref 13–44)
MCHC RBC-ENTMCNC: 29.6 PG — SIGNIFICANT CHANGE UP (ref 27–34)
MCHC RBC-ENTMCNC: 32.8 GM/DL — SIGNIFICANT CHANGE UP (ref 32–36)
MCV RBC AUTO: 90.2 FL — SIGNIFICANT CHANGE UP (ref 80–100)
MONOCYTES # BLD AUTO: 0.42 K/UL — SIGNIFICANT CHANGE UP (ref 0–0.9)
MONOCYTES NFR BLD AUTO: 9.9 % — SIGNIFICANT CHANGE UP (ref 2–14)
NEUTROPHILS # BLD AUTO: 2.84 K/UL — SIGNIFICANT CHANGE UP (ref 1.8–7.4)
NEUTROPHILS NFR BLD AUTO: 67 % — SIGNIFICANT CHANGE UP (ref 43–77)
NITRITE UR-MCNC: NEGATIVE — SIGNIFICANT CHANGE UP
PH UR: 6 — SIGNIFICANT CHANGE UP (ref 5–8)
PLATELET # BLD AUTO: 210 K/UL — SIGNIFICANT CHANGE UP (ref 150–400)
POTASSIUM SERPL-MCNC: 3.8 MMOL/L — SIGNIFICANT CHANGE UP (ref 3.5–5.3)
POTASSIUM SERPL-SCNC: 3.8 MMOL/L — SIGNIFICANT CHANGE UP (ref 3.5–5.3)
PROT SERPL-MCNC: 7.1 GM/DL — SIGNIFICANT CHANGE UP (ref 6–8.3)
PROT UR-MCNC: NEGATIVE MG/DL — SIGNIFICANT CHANGE UP
RBC # BLD: 4.8 M/UL — SIGNIFICANT CHANGE UP (ref 4.2–5.8)
RBC # FLD: 13.3 % — SIGNIFICANT CHANGE UP (ref 10.3–14.5)
SODIUM SERPL-SCNC: 137 MMOL/L — SIGNIFICANT CHANGE UP (ref 135–145)
SP GR SPEC: 1.01 — SIGNIFICANT CHANGE UP (ref 1.01–1.02)
UROBILINOGEN FLD QL: NEGATIVE MG/DL — SIGNIFICANT CHANGE UP
WBC # BLD: 4.24 K/UL — SIGNIFICANT CHANGE UP (ref 3.8–10.5)
WBC # FLD AUTO: 4.24 K/UL — SIGNIFICANT CHANGE UP (ref 3.8–10.5)

## 2019-12-09 PROCEDURE — 81003 URINALYSIS AUTO W/O SCOPE: CPT

## 2019-12-09 PROCEDURE — 80053 COMPREHEN METABOLIC PANEL: CPT

## 2019-12-09 PROCEDURE — 86703 HIV-1/HIV-2 1 RESULT ANTBDY: CPT

## 2019-12-09 PROCEDURE — 36415 COLL VENOUS BLD VENIPUNCTURE: CPT

## 2019-12-09 PROCEDURE — 96374 THER/PROPH/DIAG INJ IV PUSH: CPT

## 2019-12-09 PROCEDURE — 83690 ASSAY OF LIPASE: CPT

## 2019-12-09 PROCEDURE — 74176 CT ABD & PELVIS W/O CONTRAST: CPT

## 2019-12-09 PROCEDURE — 99284 EMERGENCY DEPT VISIT MOD MDM: CPT

## 2019-12-09 PROCEDURE — 87086 URINE CULTURE/COLONY COUNT: CPT

## 2019-12-09 PROCEDURE — 74176 CT ABD & PELVIS W/O CONTRAST: CPT | Mod: 26

## 2019-12-09 PROCEDURE — 87591 N.GONORRHOEAE DNA AMP PROB: CPT

## 2019-12-09 PROCEDURE — 85025 COMPLETE CBC W/AUTO DIFF WBC: CPT

## 2019-12-09 PROCEDURE — 96372 THER/PROPH/DIAG INJ SC/IM: CPT | Mod: XU

## 2019-12-09 PROCEDURE — 87491 CHLMYD TRACH DNA AMP PROBE: CPT

## 2019-12-09 PROCEDURE — 96375 TX/PRO/DX INJ NEW DRUG ADDON: CPT

## 2019-12-09 PROCEDURE — 99284 EMERGENCY DEPT VISIT MOD MDM: CPT | Mod: 25

## 2019-12-09 RX ORDER — MORPHINE SULFATE 50 MG/1
4 CAPSULE, EXTENDED RELEASE ORAL ONCE
Refills: 0 | Status: DISCONTINUED | OUTPATIENT
Start: 2019-12-09 | End: 2019-12-09

## 2019-12-09 RX ORDER — AZITHROMYCIN 500 MG/1
1000 TABLET, FILM COATED ORAL ONCE
Refills: 0 | Status: COMPLETED | OUTPATIENT
Start: 2019-12-09 | End: 2019-12-09

## 2019-12-09 RX ORDER — CEFTRIAXONE 500 MG/1
250 INJECTION, POWDER, FOR SOLUTION INTRAMUSCULAR; INTRAVENOUS ONCE
Refills: 0 | Status: COMPLETED | OUTPATIENT
Start: 2019-12-09 | End: 2019-12-09

## 2019-12-09 RX ORDER — SODIUM CHLORIDE 9 MG/ML
1000 INJECTION INTRAMUSCULAR; INTRAVENOUS; SUBCUTANEOUS ONCE
Refills: 0 | Status: COMPLETED | OUTPATIENT
Start: 2019-12-09 | End: 2019-12-09

## 2019-12-09 RX ORDER — ONDANSETRON 8 MG/1
4 TABLET, FILM COATED ORAL ONCE
Refills: 0 | Status: COMPLETED | OUTPATIENT
Start: 2019-12-09 | End: 2019-12-09

## 2019-12-09 RX ADMIN — ONDANSETRON 4 MILLIGRAM(S): 8 TABLET, FILM COATED ORAL at 11:16

## 2019-12-09 RX ADMIN — AZITHROMYCIN 1000 MILLIGRAM(S): 500 TABLET, FILM COATED ORAL at 13:51

## 2019-12-09 RX ADMIN — SODIUM CHLORIDE 1000 MILLILITER(S): 9 INJECTION INTRAMUSCULAR; INTRAVENOUS; SUBCUTANEOUS at 10:56

## 2019-12-09 RX ADMIN — MORPHINE SULFATE 4 MILLIGRAM(S): 50 CAPSULE, EXTENDED RELEASE ORAL at 11:14

## 2019-12-09 RX ADMIN — CEFTRIAXONE 250 MILLIGRAM(S): 500 INJECTION, POWDER, FOR SOLUTION INTRAMUSCULAR; INTRAVENOUS at 13:51

## 2019-12-09 RX ADMIN — Medication 100 MILLIGRAM(S): at 13:50

## 2019-12-09 NOTE — ED ADULT TRIAGE NOTE - CHIEF COMPLAINT QUOTE
Pt. to the ED BIBA C/O Lower abdominal pain- Denies Chills/Fever, Nausea and Diarrhea. Pt. states he was evaluated last week for Right sided Flank pain and was DC home- Denies Urinary Symptoms.

## 2019-12-09 NOTE — ED PROVIDER NOTE - NSFOLLOWUPINSTRUCTIONS_ED_ALL_ED_FT
Urethritis, Adult     Urethritis is swelling (inflammation) of the urethra. The urethra is the tube that drains urine from the bladder. It is important to get treatment for this condition early. Delayed treatment may lead to complications.  What are the causes?  This condition may be caused by:  Germs that are spread through sexual contact. This is the leading cause of urethritis. This may include bacterial or viral infections.Injury to the urethra. Injury can happen after a thin, flexible tube (catheter) is inserted into the urethra to drain urine, or after medical instruments or foreign bodies are inserted into the area.Chemical irritation. This may include contact with spermicide.A disease that causes inflammation. This is rare.What increases the risk?  The following factors may make you more likely to develop this condition:  Having sex without using a condom.Having multiple sexual partners.Having poor hygiene.What are the signs or symptoms?  Symptoms of this condition include:  Pain with urination.Frequent urination.Urgent need to urinate.Itching and pain in the vagina or penis.Discharge coming from the penis.However, women rarely have symptoms.  How is this diagnosed?  This condition is diagnosed based on your medical history and symptoms as well as a physical exam. Tests may also be done. These may include:  Urine tests.Swabs from the urethra.How is this treated?  Treatment for this condition depends on the cause. Urethritis caused by a bacterial infection is treated with antibiotic medicine. Any sexual partners must also be treated.  Follow these instructions at home:  Medicines     Take over-the-counter and prescription medicines only as told by your health care provider.If you were prescribed antibiotic medicine, take it as told by your health care provider. Do not stop taking the antibiotic even if you start to feel better.Lifestyle     Avoid using perfumed soaps, bubble bath, and shampoo when you bathe or shower. Rinse the vaginal area after bathing.Wear cotton underwear. Not wearing underwear when going to bed can help.Make sure to wipe from front to back after using the toilet if you are female.Do not have sex until your health care provider approves. When you do have sex, be sure to practice safe sex.Tell anyone with whom you have had sexual relations in the past 60 days that he or she may be at risk of infection.General instructions     Drink enough fluid to keep your urine clear or pale yellow.It is up to you to get your test results. Ask your health care provider, or the department that is doing the test, when your results will be ready.Keep all follow-up visits as told by your health care provider. This is important.Get tested again 3 months after treatment to make sure the infection is gone. It is important that your sexual partner also gets tested again.Contact a health care provider if:  Your symptoms have not improved after 3 days.Your symptoms get worse.You have eye redness or pain.You develop abdominal pain or pelvic pain (in females).You develop joint pain.You have a fever.Get help right away if:  You have severe pain in the belly, back, or side.You vomit repeatedly.Summary  Urethritis is a swelling (inflammation) of the urethra.This condition is caused by germs that are spread through sexual contact. This is the main cause of this illness.It is important to get treatment for this condition early. Delayed treatment may lead to complications.Treatment for this condition depends on the cause. Any sexual partners must also be treated.This information is not intended to replace advice given to you by your health care provider. Make sure you discuss any questions you have with your health care provider.    Document Released: 06/13/2002 Document Revised: 01/23/2018 Document Reviewed: 01/23/2018  [employer] Patient Education © 2019 [employer]

## 2019-12-09 NOTE — ED PROVIDER NOTE - PATIENT PORTAL LINK FT
You can access the FollowMyHealth Patient Portal offered by [facility] by registering at the following website: [email or web address]. By joining [employer]’s FollowMyHealth portal, you will also be able to view your health information using other applications (apps) compatible with our system.

## 2019-12-09 NOTE — ED PROVIDER NOTE - OBJECTIVE STATEMENT
60 y/o male with a PMHx of HTN, HLD presents to the ED c/o sharp, intermittent lower abd pain since last night. Pain started after having a hard BM. +dysuria. Denies fever, chills, n/v/d, hematuria. Pt was seen in ED on 12/4/19 for right sided abd pain with no acute findings, was discharged home to follow up with GI and PMD. Pt states his current pain is not the same as last week. Pt states he is on medication for his blood pressure, is only taking half of his prescribed dose because he is running out of his medication and needs to see his PMD for a refill. +smoker. Denies illicit drug use.

## 2019-12-09 NOTE — ED PROVIDER NOTE - PROGRESS NOTE DETAILS
Robert CID for ED attending, Doctor Styles. Discussed with pt treatment of urethritis. Likely chlamydia or gonorrhea, will be treated for both. Pt consented to rapid HIV test. Aware that his partners should be tested as well. Aware that he should either abstain from sex or practice protected sex. Pt will follow up with PMD when he returns to [place] for repeat testing.

## 2019-12-10 LAB
C TRACH RRNA SPEC QL NAA+PROBE: SIGNIFICANT CHANGE UP
CULTURE RESULTS: NO GROWTH — SIGNIFICANT CHANGE UP
N GONORRHOEA RRNA SPEC QL NAA+PROBE: SIGNIFICANT CHANGE UP
SPECIMEN SOURCE: SIGNIFICANT CHANGE UP
SPECIMEN SOURCE: SIGNIFICANT CHANGE UP

## 2020-02-02 ENCOUNTER — INPATIENT (INPATIENT)
Facility: HOSPITAL | Age: 60
LOS: 4 days | Discharge: HOME | DRG: 224 | End: 2020-02-07
Attending: FAMILY MEDICINE | Admitting: FAMILY MEDICINE
Payer: MEDICAID

## 2020-02-02 VITALS — HEIGHT: 69 IN | WEIGHT: 190.04 LBS

## 2020-02-02 DIAGNOSIS — K56.609 UNSPECIFIED INTESTINAL OBSTRUCTION, UNSPECIFIED AS TO PARTIAL VERSUS COMPLETE OBSTRUCTION: ICD-10-CM

## 2020-02-02 DIAGNOSIS — Z98.890 OTHER SPECIFIED POSTPROCEDURAL STATES: Chronic | ICD-10-CM

## 2020-02-02 LAB
ALBUMIN SERPL ELPH-MCNC: 3.6 G/DL — SIGNIFICANT CHANGE UP (ref 3.3–5)
ALP SERPL-CCNC: 84 U/L — SIGNIFICANT CHANGE UP (ref 40–120)
ALT FLD-CCNC: 41 U/L — SIGNIFICANT CHANGE UP (ref 12–78)
ANION GAP SERPL CALC-SCNC: 4 MMOL/L — LOW (ref 5–17)
APTT BLD: 33.6 SEC — SIGNIFICANT CHANGE UP (ref 27.5–36.3)
AST SERPL-CCNC: 30 U/L — SIGNIFICANT CHANGE UP (ref 15–37)
BILIRUB SERPL-MCNC: 0.2 MG/DL — SIGNIFICANT CHANGE UP (ref 0.2–1.2)
BUN SERPL-MCNC: 14 MG/DL — SIGNIFICANT CHANGE UP (ref 7–23)
CALCIUM SERPL-MCNC: 8.9 MG/DL — SIGNIFICANT CHANGE UP (ref 8.5–10.1)
CHLORIDE SERPL-SCNC: 106 MMOL/L — SIGNIFICANT CHANGE UP (ref 96–108)
CO2 SERPL-SCNC: 29 MMOL/L — SIGNIFICANT CHANGE UP (ref 22–31)
CREAT SERPL-MCNC: 1.2 MG/DL — SIGNIFICANT CHANGE UP (ref 0.5–1.3)
GLUCOSE SERPL-MCNC: 105 MG/DL — HIGH (ref 70–99)
HCT VFR BLD CALC: 42.9 % — SIGNIFICANT CHANGE UP (ref 39–50)
HGB BLD-MCNC: 14.3 G/DL — SIGNIFICANT CHANGE UP (ref 13–17)
INR BLD: 1 RATIO — SIGNIFICANT CHANGE UP (ref 0.88–1.16)
LACTATE SERPL-SCNC: 1.7 MMOL/L — SIGNIFICANT CHANGE UP (ref 0.7–2)
LACTATE SERPL-SCNC: 2.4 MMOL/L — HIGH (ref 0.7–2)
MCHC RBC-ENTMCNC: 29.8 PG — SIGNIFICANT CHANGE UP (ref 27–34)
MCHC RBC-ENTMCNC: 33.3 GM/DL — SIGNIFICANT CHANGE UP (ref 32–36)
MCV RBC AUTO: 89.4 FL — SIGNIFICANT CHANGE UP (ref 80–100)
PLATELET # BLD AUTO: 220 K/UL — SIGNIFICANT CHANGE UP (ref 150–400)
POTASSIUM SERPL-MCNC: 3.9 MMOL/L — SIGNIFICANT CHANGE UP (ref 3.5–5.3)
POTASSIUM SERPL-SCNC: 3.9 MMOL/L — SIGNIFICANT CHANGE UP (ref 3.5–5.3)
PROT SERPL-MCNC: 7.4 GM/DL — SIGNIFICANT CHANGE UP (ref 6–8.3)
PROTHROM AB SERPL-ACNC: 11.1 SEC — SIGNIFICANT CHANGE UP (ref 10–12.9)
RBC # BLD: 4.8 M/UL — SIGNIFICANT CHANGE UP (ref 4.2–5.8)
RBC # FLD: 12.9 % — SIGNIFICANT CHANGE UP (ref 10.3–14.5)
SODIUM SERPL-SCNC: 139 MMOL/L — SIGNIFICANT CHANGE UP (ref 135–145)
TROPONIN I SERPL-MCNC: <0.015 NG/ML — SIGNIFICANT CHANGE UP (ref 0.01–0.04)
TROPONIN I SERPL-MCNC: <0.015 NG/ML — SIGNIFICANT CHANGE UP (ref 0.01–0.04)
WBC # BLD: 4.18 K/UL — SIGNIFICANT CHANGE UP (ref 3.8–10.5)
WBC # FLD AUTO: 4.18 K/UL — SIGNIFICANT CHANGE UP (ref 3.8–10.5)

## 2020-02-02 PROCEDURE — 71275 CT ANGIOGRAPHY CHEST: CPT | Mod: 26

## 2020-02-02 PROCEDURE — 74019 RADEX ABDOMEN 2 VIEWS: CPT

## 2020-02-02 PROCEDURE — 99222 1ST HOSP IP/OBS MODERATE 55: CPT

## 2020-02-02 PROCEDURE — 36415 COLL VENOUS BLD VENIPUNCTURE: CPT

## 2020-02-02 PROCEDURE — 86803 HEPATITIS C AB TEST: CPT

## 2020-02-02 PROCEDURE — 83735 ASSAY OF MAGNESIUM: CPT

## 2020-02-02 PROCEDURE — 85027 COMPLETE CBC AUTOMATED: CPT

## 2020-02-02 PROCEDURE — 83605 ASSAY OF LACTIC ACID: CPT

## 2020-02-02 PROCEDURE — 93010 ELECTROCARDIOGRAM REPORT: CPT

## 2020-02-02 PROCEDURE — 93306 TTE W/DOPPLER COMPLETE: CPT

## 2020-02-02 PROCEDURE — 84484 ASSAY OF TROPONIN QUANT: CPT

## 2020-02-02 PROCEDURE — 71045 X-RAY EXAM CHEST 1 VIEW: CPT | Mod: 26,76

## 2020-02-02 PROCEDURE — C9113: CPT

## 2020-02-02 PROCEDURE — 84100 ASSAY OF PHOSPHORUS: CPT

## 2020-02-02 PROCEDURE — 74019 RADEX ABDOMEN 2 VIEWS: CPT | Mod: 26

## 2020-02-02 PROCEDURE — 80048 BASIC METABOLIC PNL TOTAL CA: CPT

## 2020-02-02 PROCEDURE — 88305 TISSUE EXAM BY PATHOLOGIST: CPT

## 2020-02-02 PROCEDURE — 74174 CTA ABD&PLVS W/CONTRAST: CPT | Mod: 26

## 2020-02-02 PROCEDURE — 71045 X-RAY EXAM CHEST 1 VIEW: CPT

## 2020-02-02 PROCEDURE — 80053 COMPREHEN METABOLIC PANEL: CPT

## 2020-02-02 RX ORDER — MORPHINE SULFATE 50 MG/1
4 CAPSULE, EXTENDED RELEASE ORAL ONCE
Refills: 0 | Status: DISCONTINUED | OUTPATIENT
Start: 2020-02-02 | End: 2020-02-02

## 2020-02-02 RX ORDER — ASPIRIN/CALCIUM CARB/MAGNESIUM 324 MG
1 TABLET ORAL
Qty: 0 | Refills: 0 | DISCHARGE

## 2020-02-02 RX ORDER — AMLODIPINE BESYLATE 2.5 MG/1
1 TABLET ORAL
Qty: 0 | Refills: 0 | DISCHARGE

## 2020-02-02 RX ORDER — KETOROLAC TROMETHAMINE 30 MG/ML
15 SYRINGE (ML) INJECTION EVERY 6 HOURS
Refills: 0 | Status: DISCONTINUED | OUTPATIENT
Start: 2020-02-02 | End: 2020-02-03

## 2020-02-02 RX ORDER — LISINOPRIL 2.5 MG/1
10 TABLET ORAL DAILY
Refills: 0 | Status: DISCONTINUED | OUTPATIENT
Start: 2020-02-02 | End: 2020-02-03

## 2020-02-02 RX ORDER — ACETAMINOPHEN 500 MG
1000 TABLET ORAL EVERY 6 HOURS
Refills: 0 | Status: COMPLETED | OUTPATIENT
Start: 2020-02-02 | End: 2020-02-03

## 2020-02-02 RX ORDER — FAMOTIDINE 10 MG/ML
20 INJECTION INTRAVENOUS DAILY
Refills: 0 | Status: DISCONTINUED | OUTPATIENT
Start: 2020-02-02 | End: 2020-02-03

## 2020-02-02 RX ORDER — LISINOPRIL 2.5 MG/1
1 TABLET ORAL
Qty: 0 | Refills: 0 | DISCHARGE

## 2020-02-02 RX ORDER — METOPROLOL TARTRATE 50 MG
25 TABLET ORAL
Refills: 0 | Status: DISCONTINUED | OUTPATIENT
Start: 2020-02-02 | End: 2020-02-03

## 2020-02-02 RX ORDER — ATORVASTATIN CALCIUM 80 MG/1
80 TABLET, FILM COATED ORAL AT BEDTIME
Refills: 0 | Status: DISCONTINUED | OUTPATIENT
Start: 2020-02-02 | End: 2020-02-03

## 2020-02-02 RX ORDER — ENOXAPARIN SODIUM 100 MG/ML
40 INJECTION SUBCUTANEOUS DAILY
Refills: 0 | Status: DISCONTINUED | OUTPATIENT
Start: 2020-02-02 | End: 2020-02-03

## 2020-02-02 RX ORDER — CLOPIDOGREL BISULFATE 75 MG/1
300 TABLET, FILM COATED ORAL ONCE
Refills: 0 | Status: COMPLETED | OUTPATIENT
Start: 2020-02-02 | End: 2020-02-02

## 2020-02-02 RX ORDER — AMLODIPINE BESYLATE 2.5 MG/1
10 TABLET ORAL DAILY
Refills: 0 | Status: DISCONTINUED | OUTPATIENT
Start: 2020-02-02 | End: 2020-02-03

## 2020-02-02 RX ORDER — MORPHINE SULFATE 50 MG/1
4 CAPSULE, EXTENDED RELEASE ORAL EVERY 4 HOURS
Refills: 0 | Status: DISCONTINUED | OUTPATIENT
Start: 2020-02-02 | End: 2020-02-02

## 2020-02-02 RX ORDER — METOPROLOL TARTRATE 50 MG
1 TABLET ORAL
Qty: 0 | Refills: 0 | DISCHARGE

## 2020-02-02 RX ORDER — LABETALOL HCL 100 MG
10 TABLET ORAL ONCE
Refills: 0 | Status: COMPLETED | OUTPATIENT
Start: 2020-02-02 | End: 2020-02-02

## 2020-02-02 RX ORDER — SODIUM CHLORIDE 9 MG/ML
1000 INJECTION, SOLUTION INTRAVENOUS ONCE
Refills: 0 | Status: COMPLETED | OUTPATIENT
Start: 2020-02-02 | End: 2020-02-02

## 2020-02-02 RX ORDER — KETOROLAC TROMETHAMINE 30 MG/ML
30 SYRINGE (ML) INJECTION ONCE
Refills: 0 | Status: DISCONTINUED | OUTPATIENT
Start: 2020-02-02 | End: 2020-02-03

## 2020-02-02 RX ORDER — METOPROLOL TARTRATE 50 MG
5 TABLET ORAL EVERY 6 HOURS
Refills: 0 | Status: DISCONTINUED | OUTPATIENT
Start: 2020-02-02 | End: 2020-02-03

## 2020-02-02 RX ORDER — ATORVASTATIN CALCIUM 80 MG/1
1 TABLET, FILM COATED ORAL
Qty: 0 | Refills: 0 | DISCHARGE

## 2020-02-02 RX ORDER — ERGOCALCIFEROL 1.25 MG/1
1 CAPSULE ORAL
Qty: 0 | Refills: 0 | DISCHARGE

## 2020-02-02 RX ORDER — SODIUM CHLORIDE 9 MG/ML
1000 INJECTION, SOLUTION INTRAVENOUS
Refills: 0 | Status: DISCONTINUED | OUTPATIENT
Start: 2020-02-02 | End: 2020-02-03

## 2020-02-02 RX ORDER — HYDRALAZINE HCL 50 MG
5 TABLET ORAL ONCE
Refills: 0 | Status: COMPLETED | OUTPATIENT
Start: 2020-02-02 | End: 2020-02-02

## 2020-02-02 RX ORDER — ONDANSETRON 8 MG/1
4 TABLET, FILM COATED ORAL ONCE
Refills: 0 | Status: COMPLETED | OUTPATIENT
Start: 2020-02-02 | End: 2020-02-02

## 2020-02-02 RX ORDER — HYDRALAZINE HCL 50 MG
10 TABLET ORAL ONCE
Refills: 0 | Status: COMPLETED | OUTPATIENT
Start: 2020-02-02 | End: 2020-02-02

## 2020-02-02 RX ORDER — ASPIRIN/CALCIUM CARB/MAGNESIUM 324 MG
324 TABLET ORAL ONCE
Refills: 0 | Status: COMPLETED | OUTPATIENT
Start: 2020-02-02 | End: 2020-02-02

## 2020-02-02 RX ADMIN — MORPHINE SULFATE 4 MILLIGRAM(S): 50 CAPSULE, EXTENDED RELEASE ORAL at 20:40

## 2020-02-02 RX ADMIN — MORPHINE SULFATE 4 MILLIGRAM(S): 50 CAPSULE, EXTENDED RELEASE ORAL at 11:31

## 2020-02-02 RX ADMIN — Medication 1.25 MILLIGRAM(S): at 20:10

## 2020-02-02 RX ADMIN — Medication 15 MILLIGRAM(S): at 14:41

## 2020-02-02 RX ADMIN — MORPHINE SULFATE 4 MILLIGRAM(S): 50 CAPSULE, EXTENDED RELEASE ORAL at 11:22

## 2020-02-02 RX ADMIN — ONDANSETRON 4 MILLIGRAM(S): 8 TABLET, FILM COATED ORAL at 20:39

## 2020-02-02 RX ADMIN — SODIUM CHLORIDE 100 MILLILITER(S): 9 INJECTION, SOLUTION INTRAVENOUS at 17:45

## 2020-02-02 RX ADMIN — SODIUM CHLORIDE 1000 MILLILITER(S): 9 INJECTION, SOLUTION INTRAVENOUS at 17:40

## 2020-02-02 RX ADMIN — Medication 10 MILLIGRAM(S): at 08:45

## 2020-02-02 RX ADMIN — Medication 5 MILLIGRAM(S): at 17:45

## 2020-02-02 RX ADMIN — MORPHINE SULFATE 4 MILLIGRAM(S): 50 CAPSULE, EXTENDED RELEASE ORAL at 20:10

## 2020-02-02 RX ADMIN — Medication 10 MILLIGRAM(S): at 12:02

## 2020-02-02 RX ADMIN — MORPHINE SULFATE 4 MILLIGRAM(S): 50 CAPSULE, EXTENDED RELEASE ORAL at 13:00

## 2020-02-02 RX ADMIN — MORPHINE SULFATE 4 MILLIGRAM(S): 50 CAPSULE, EXTENDED RELEASE ORAL at 12:44

## 2020-02-02 RX ADMIN — AMLODIPINE BESYLATE 10 MILLIGRAM(S): 2.5 TABLET ORAL at 14:45

## 2020-02-02 RX ADMIN — Medication 324 MILLIGRAM(S): at 09:15

## 2020-02-02 RX ADMIN — Medication 5 MILLIGRAM(S): at 22:08

## 2020-02-02 RX ADMIN — CLOPIDOGREL BISULFATE 300 MILLIGRAM(S): 75 TABLET, FILM COATED ORAL at 09:15

## 2020-02-02 RX ADMIN — MORPHINE SULFATE 4 MILLIGRAM(S): 50 CAPSULE, EXTENDED RELEASE ORAL at 08:55

## 2020-02-02 RX ADMIN — LISINOPRIL 10 MILLIGRAM(S): 2.5 TABLET ORAL at 14:46

## 2020-02-02 NOTE — ED PROVIDER NOTE - CARE PLAN
Principal Discharge DX:	Gastroenteritis  Secondary Diagnosis:	Abnormal EKG  Secondary Diagnosis:	Cocaine abuse Principal Discharge DX:	Small bowel obstruction  Secondary Diagnosis:	Abnormal EKG  Secondary Diagnosis:	Cocaine abuse

## 2020-02-02 NOTE — CONSULT NOTE ADULT - ASSESSMENT
assessment Dx:                       1. SBO                        2. HTN                       3. Abnormal EKG                        4. Cocaine abuse.     Plan:    admit to surgery , NGT,                medications: Pain meds,  Morphine IV , enalaprilat 1.25 IV daily for HTN                VTEP: Venodyne                Labs: cbc , cmp ,                Radiology: CTA abd.               Cardiac diagnostics: Repeat EKG                Consults: GI, Cardiology. assessment Dx:                       1. SBO                        2. HTN                       3. Abnormal EKG                        4. Cocaine abuse.     Plan:    admit to surgery , NGT,                medications: Pain meds,  Morphine IV , enalaprilat 1.25 IV q6h PRN for HTN while NGT.                VTEP: Venodyne                Labs: cbc , cmp ,                Radiology: CTA abd.               Cardiac diagnostics: Repeat EKG                Consults:  Cardiology.

## 2020-02-02 NOTE — ED PROVIDER NOTE - OBJECTIVE STATEMENT
58yo m pmh HTN, p/w chest and abdominal pain. Symptoms since last night w/ one episode vomiting. No diaphoresis. No shortness of breath. Pain is LUQ radiating to chest and back. No cough, fevers or chills. Hx of stress test many years ago.

## 2020-02-02 NOTE — ED PROVIDER NOTE - CLINICAL SUMMARY MEDICAL DECISION MAKING FREE TEXT BOX
LUQ abdominal pain radiating to chest-> STEMI on EKG, code stemi called however pain primarily in abdomen w/ TTP, CTA for dissection neg. pt initially endorsed no drug use and received labetolol-then endorsed cocaine use on thursday.

## 2020-02-02 NOTE — H&P ADULT - NSHPPHYSICALEXAM_GEN_ALL_CORE
Physical exam:     gen : ao x3  pulm: equal air entry bialterally  cv : [id number]  abdomen: soft, distended, tender to palpation in the epigastric area, no hernia,   old surgical scar visible in the left inguinal hernia

## 2020-02-02 NOTE — PHARMACOTHERAPY INTERVENTION NOTE - COMMENTS
Med rec is complete. Checked [facility] for med hx. Tried to confirm with patient if he's taking anything, but he stated he does not currently take any medication. Patient seemed unreliable given poor physical condition at the time of encounter. Mother stated he used to take amlodipine, but does not know if he's currently still taking it.

## 2020-02-02 NOTE — CONSULT NOTE ADULT - SUBJECTIVE AND OBJECTIVE BOX
CC:Patient is a 59y old  Male who presents with a chief complaint of  LUQ pain radiating into his chest and back, vomiting.    HPI: The patient  is a 58 yo male with Hx. of HTN,HLD,  Cocaine abuse who lives in [place] and came to visit his mother when he developed vomiting x1 and LUQ pain radiating into his back.   Had EKG done which showed ST elev. in ant leads.Trop <0.015 neg, repeat EKG no change.  CTA abdo shows SBO. Had NGT placed,  had surgical consult, had Cardiology cons. Dr. Hobbs. no signs of ACS.       PMH: as above  PSH: L inguinal hernia repair 20 y ago.   Meds: per reconciliation sheet, noted below  MEDICATIONS  (STANDING):  amLODIPine   Tablet 10 milliGRAM(s) Oral daily  atorvastatin 80 milliGRAM(s) Oral at bedtime  enoxaparin Injectable 40 milliGRAM(s) SubCutaneous daily  famotidine  IVPB 20 milliGRAM(s) IV Intermittent daily  lactated ringers. 1000 milliLiter(s) (100 mL/Hr) IV Continuous <Continuous>  lisinopril 10 milliGRAM(s) Oral daily  metoprolol tartrate 25 milliGRAM(s) Oral two times a day  metoprolol tartrate Injectable 5 milliGRAM(s) IV Push every 6 hours    MEDICATIONS  (PRN):  ketorolac   Injectable 15 milliGRAM(s) IV Push every 6 hours PRN Moderate Pain (4 - 6)  morphine  - Injectable 4 milliGRAM(s) IV Push every 4 hours PRN Severe Pain (7 - 10)    Allergies    No Known Allergies    Intolerances      Social: current smoker , used Cocain 2 days ago, drinks beer daily     FAMILY HISTORY: mother HTN, father  of Alzheimers.     ROS:  as in HPI,   the patient denies fever, no chills, no HA, no dizziness, no sore throat, no blurry vision, no CP, no palpitations, no SOB, no cough, no dysuria, no leg pain, no claudication, no rash, no joint aches, no rectal pain or bleeding, no night sweats    Vital Signs Last 24 Hrs  T(C): 36.4 (2020 11:33), Max: 36.4 (2020 08:46)  T(F): 97.5 (2020 11:33), Max: 97.6 (2020 08:46)  HR: 81 (2020 12:44) (70 - 90)  BP: 181/77 (2020 12:44) (156/104 - 222/123)  BP(mean): 119 (2020 11:33) (119 - 149)  RR: 18 (2020 12:44) (15 - 19)  SpO2: 100% (2020 12:44) (95% - 100%)  Daily Height in cm: 175.26 (2020 08:35)    Daily     PE:    Constitutional:   HEENT:  EOMI, PERRLA  Neck: supple  Back: no tenderness  Respiratory: clear  Cardiovascular: S1S2 regular, no murmurs  Abdomen: tender, no BS  Genitourinary: deferred  Rectal: deferred  Musculoskeletal: no muscle tenderness, no joint swelling or tenderness  Extremities: no pedal edema  Neurological: AxOx3, moving all extremities, no focal deficits  Skin: no rashes    Labs:                        14.3   4.18  )-----------( 220      ( 2020 08:45 )             42.9     02-02    139  |  106  |  14  ----------------------------<  105<H>  3.9   |  29  |  1.20    Ca    8.9      2020 08:45    TPro  7.4  /  Alb  3.6  /  TBili  0.2  /  DBili  x   /  AST  30  /  ALT  41  /  AlkPhos  84  02-02     LIVER FUNCTIONS - ( 2020 08:45 )  Alb: 3.6 g/dL / Pro: 7.4 gm/dL / ALK PHOS: 84 U/L / ALT: 41 U/L / AST: 30 U/L / GGT: x        CTA abd: No aortic dissection in the thorax or abdomen.What likely represents early developing small bowel obstruction secondary to an internal hernia is suspected with details as above.  Troponin <0.015 x 2, UA neg.              Radiology:

## 2020-02-02 NOTE — CONSULT NOTE ADULT - ASSESSMENT
59M with findings suggestive of small bowel obstruction on CTA and NSTEMI per ekg on admission     - manage sbo conservatively  - no immediate surgical intervention necessary at this point.   - ngt in place   - monitor output  - replete lytes prn  - serial abdominal exams  - medicine admission , trend trp  - monitor bowel function  - ivf resuscitation   surgery will continue to follow

## 2020-02-02 NOTE — ED PROVIDER NOTE - PHYSICAL EXAMINATION
GEN - uncomfortable appearing   HEAD - NC/AT     EYES - EOMI, no conjunctival pallor, no scleral icterus  ENT -   mucous membranes  moist , no discharge      NECK - Neck supple  PULM - CTA b/l,  symmetric breath sounds  COR -  RRR, S1 S2, no murmurs  ABD -LUQ TTP     BACK - no CVA tenderness, nontender spine     EXTREMS - no edema, no deformity, warm and well perfused    SKIN - no rash or bruising      NEUROLOGIC - alert, sensation nl, motor 5/5 RUE/LUE/RLE/LLE

## 2020-02-02 NOTE — ED ADULT TRIAGE NOTE - CHIEF COMPLAINT QUOTE
pt c/o chest pain and abd pain started when he woke up this morning.  pt on the floor due to pain, unable to obtain any add'l info, directed pt to MAIN

## 2020-02-02 NOTE — ED ADULT NURSE REASSESSMENT NOTE - NS ED NURSE REASSESS COMMENT FT1
pt received from prior RN at 1015. Pt medicated for pain with intermittent relief. Pt report given to N.  RN aware that call placed to surgicery at [facility] and pt request for further evaluation due to recurrent pain.  Message left for surgery and notified them of pt request and transfer to . pt received from prior RN at 1015. Pt medicated for pain and htn with intermittent relief. Pt report given to 3N.  RN aware that call placed to surgery at family and pt request for further evaluation due to recurrent pain.  Message left for surgery and notified them of pt request and transfer to 3N. NGT capped after given po medication and for transfer. 3N RN aware that NGT capped at this time.

## 2020-02-02 NOTE — CONSULT NOTE ADULT - SUBJECTIVE AND OBJECTIVE BOX
59M seen and examined in the ed who presented with complaints of epigastric pain and chest pain. Patient states he had done cocaine about 48 hours ago and his po intake in last 24 hour had been excessive. Patient states due to worsening epigastric discomfort he decided to come to the emergency room. Denies any nausea or vomiting currently but does claim to have epigastric discomfort. Denies any similar episodes in the past. No fevers or chills reported. Patient reports of having one episode of vomiting prior to coming to the hospital.     PMhx : htn, hld ( denies taking home bp meds)   PSHx : left inguinal hernia repair (20 yrs ago) , back surgery     ICU Vital Signs Last 24 Hrs  T(C): 36.4 (02 Feb 2020 11:33), Max: 36.4 (02 Feb 2020 08:46)  T(F): 97.5 (02 Feb 2020 11:33), Max: 97.6 (02 Feb 2020 08:46)  HR: 75 (02 Feb 2020 11:33) (70 - 90)  BP: 156/104 (02 Feb 2020 11:33) (156/104 - 222/123)  BP(mean): 119 (02 Feb 2020 11:33) (119 - 149)  ABP: --  ABP(mean): --  RR: 16 (02 Feb 2020 11:33) (15 - 19)  SpO2: 100% (02 Feb 2020 11:33) (95% - 100%)                          14.3   4.18  )-----------( 220      ( 02 Feb 2020 08:45 )             42.9     02-02    139  |  106  |  14  ----------------------------<  105<H>  3.9   |  29  |  1.20    Ca    8.9      02 Feb 2020 08:45    TPro  7.4  /  Alb  3.6  /  TBili  0.2  /  DBili  x   /  AST  30  /  ALT  41  /  AlkPhos  84  02-02    Physical exam:     gen : ao x3  pulm: equal air entry bialterally  cv : [id number]  abdomen: soft, distended, tender to palpation in the epigastric area, no hernia,   old surgical scar visible in the left inguinal hernia.

## 2020-02-02 NOTE — PATIENT PROFILE ADULT - NSPROIMPLANTSMEDDEV_GEN_A_NUR
18 yo female in the ER with sore throat, pain with swallowing, slight chills, dry cough since yesterday.   Pt denies SOB, noght sweats, did not check her temperature. Pt has no other complaints.
None

## 2020-02-02 NOTE — H&P ADULT - HISTORY OF PRESENT ILLNESS
59M seen and examined in the ed who presented with complaints of epigastric pain and chest pain. Patient states he had done cocaine about 48 hours ago and his po intake in last 24 hour had been excessive. Patient states due to worsening epigastric discomfort he decided to come to the emergency room. Denies any nausea or vomiting currently but does claim to have epigastric discomfort. Denies any similar episodes in the past. No fevers or chills reported. Patient reports of having one episode of vomiting prior to coming to the hospital.     PMhx : htn, hld ( denies taking home bp meds)   PSHx : left inguinal hernia repair (20 yrs ago) , back surgery     ICU Vital Signs Last 24 Hrs  T(C): 36.4 (02 Feb 2020 11:33), Max: 36.4 (02 Feb 2020 08:46)  T(F): 97.5 (02 Feb 2020 11:33), Max: 97.6 (02 Feb 2020 08:46)  HR: 75 (02 Feb 2020 11:33) (70 - 90)  BP: 156/104 (02 Feb 2020 11:33) (156/104 - 222/123)  BP(mean): 119 (02 Feb 2020 11:33) (119 - 149)  ABP: --  ABP(mean): --  RR: 16 (02 Feb 2020 11:33) (15 - 19)  SpO2: 100% (02 Feb 2020 11:33) (95% - 100%)                          14.3   4.18  )-----------( 220      ( 02 Feb 2020 08:45 )             42.9     02-02    139  |  106  |  14  ----------------------------<  105<H>  3.9   |  29  |  1.20    Ca    8.9      02 Feb 2020 08:45    TPro  7.4  /  Alb  3.6  /  TBili  0.2  /  DBili  x   /  AST  30  /  ALT  41  /  AlkPhos  84  02-02    Physical exam:     gen : ao x3  pulm: equal air entry bialterally  cv : [id number]  abdomen: soft, distended, tender to palpation in the epigastric area, no hernia,   old surgical scar visible in the left inguinal hernia

## 2020-02-03 ENCOUNTER — RESULT REVIEW (OUTPATIENT)
Age: 60
End: 2020-02-03

## 2020-02-03 LAB
ADD ON TEST-SPECIMEN IN LAB: SIGNIFICANT CHANGE UP
ALBUMIN SERPL ELPH-MCNC: 3.4 G/DL — SIGNIFICANT CHANGE UP (ref 3.3–5)
ALP SERPL-CCNC: 75 U/L — SIGNIFICANT CHANGE UP (ref 40–120)
ALT FLD-CCNC: 29 U/L — SIGNIFICANT CHANGE UP (ref 12–78)
ANION GAP SERPL CALC-SCNC: 8 MMOL/L — SIGNIFICANT CHANGE UP (ref 5–17)
AST SERPL-CCNC: 24 U/L — SIGNIFICANT CHANGE UP (ref 15–37)
BILIRUB SERPL-MCNC: 0.6 MG/DL — SIGNIFICANT CHANGE UP (ref 0.2–1.2)
BUN SERPL-MCNC: 10 MG/DL — SIGNIFICANT CHANGE UP (ref 7–23)
CALCIUM SERPL-MCNC: 9.1 MG/DL — SIGNIFICANT CHANGE UP (ref 8.5–10.1)
CHLORIDE SERPL-SCNC: 105 MMOL/L — SIGNIFICANT CHANGE UP (ref 96–108)
CO2 SERPL-SCNC: 26 MMOL/L — SIGNIFICANT CHANGE UP (ref 22–31)
CREAT SERPL-MCNC: 1.06 MG/DL — SIGNIFICANT CHANGE UP (ref 0.5–1.3)
GLUCOSE SERPL-MCNC: 117 MG/DL — HIGH (ref 70–99)
HCT VFR BLD CALC: 43.1 % — SIGNIFICANT CHANGE UP (ref 39–50)
HCV AB S/CO SERPL IA: 0.1 S/CO — SIGNIFICANT CHANGE UP (ref 0–0.99)
HCV AB SERPL-IMP: SIGNIFICANT CHANGE UP
HGB BLD-MCNC: 14.5 G/DL — SIGNIFICANT CHANGE UP (ref 13–17)
MCHC RBC-ENTMCNC: 29.7 PG — SIGNIFICANT CHANGE UP (ref 27–34)
MCHC RBC-ENTMCNC: 33.6 GM/DL — SIGNIFICANT CHANGE UP (ref 32–36)
MCV RBC AUTO: 88.1 FL — SIGNIFICANT CHANGE UP (ref 80–100)
PLATELET # BLD AUTO: 233 K/UL — SIGNIFICANT CHANGE UP (ref 150–400)
POTASSIUM SERPL-MCNC: 3.5 MMOL/L — SIGNIFICANT CHANGE UP (ref 3.5–5.3)
POTASSIUM SERPL-SCNC: 3.5 MMOL/L — SIGNIFICANT CHANGE UP (ref 3.5–5.3)
PROT SERPL-MCNC: 7.2 GM/DL — SIGNIFICANT CHANGE UP (ref 6–8.3)
RBC # BLD: 4.89 M/UL — SIGNIFICANT CHANGE UP (ref 4.2–5.8)
RBC # FLD: 13.2 % — SIGNIFICANT CHANGE UP (ref 10.3–14.5)
SODIUM SERPL-SCNC: 139 MMOL/L — SIGNIFICANT CHANGE UP (ref 135–145)
WBC # BLD: 7.27 K/UL — SIGNIFICANT CHANGE UP (ref 3.8–10.5)
WBC # FLD AUTO: 7.27 K/UL — SIGNIFICANT CHANGE UP (ref 3.8–10.5)

## 2020-02-03 PROCEDURE — 44050 REDUCE BOWEL OBSTRUCTION: CPT

## 2020-02-03 PROCEDURE — 71045 X-RAY EXAM CHEST 1 VIEW: CPT | Mod: 26

## 2020-02-03 PROCEDURE — 93306 TTE W/DOPPLER COMPLETE: CPT | Mod: 26

## 2020-02-03 PROCEDURE — 99232 SBSQ HOSP IP/OBS MODERATE 35: CPT | Mod: 57

## 2020-02-03 PROCEDURE — 88305 TISSUE EXAM BY PATHOLOGIST: CPT | Mod: 26

## 2020-02-03 PROCEDURE — 99233 SBSQ HOSP IP/OBS HIGH 50: CPT

## 2020-02-03 RX ORDER — FAMOTIDINE 10 MG/ML
20 INJECTION INTRAVENOUS DAILY
Refills: 0 | Status: DISCONTINUED | OUTPATIENT
Start: 2020-02-03 | End: 2020-02-03

## 2020-02-03 RX ORDER — HYDRALAZINE HCL 50 MG
5 TABLET ORAL ONCE
Refills: 0 | Status: COMPLETED | OUTPATIENT
Start: 2020-02-03 | End: 2020-02-03

## 2020-02-03 RX ORDER — SODIUM CHLORIDE 9 MG/ML
1000 INJECTION, SOLUTION INTRAVENOUS ONCE
Refills: 0 | Status: COMPLETED | OUTPATIENT
Start: 2020-02-03 | End: 2020-02-03

## 2020-02-03 RX ORDER — HYDROMORPHONE HYDROCHLORIDE 2 MG/ML
1 INJECTION INTRAMUSCULAR; INTRAVENOUS; SUBCUTANEOUS
Refills: 0 | Status: DISCONTINUED | OUTPATIENT
Start: 2020-02-03 | End: 2020-02-03

## 2020-02-03 RX ORDER — KETOROLAC TROMETHAMINE 30 MG/ML
30 SYRINGE (ML) INJECTION ONCE
Refills: 0 | Status: DISCONTINUED | OUTPATIENT
Start: 2020-02-03 | End: 2020-02-03

## 2020-02-03 RX ORDER — ATORVASTATIN CALCIUM 80 MG/1
80 TABLET, FILM COATED ORAL AT BEDTIME
Refills: 0 | Status: DISCONTINUED | OUTPATIENT
Start: 2020-02-03 | End: 2020-02-07

## 2020-02-03 RX ORDER — HYDRALAZINE HCL 50 MG
10 TABLET ORAL EVERY 6 HOURS
Refills: 0 | Status: DISCONTINUED | OUTPATIENT
Start: 2020-02-03 | End: 2020-02-06

## 2020-02-03 RX ORDER — PANTOPRAZOLE SODIUM 20 MG/1
40 TABLET, DELAYED RELEASE ORAL DAILY
Refills: 0 | Status: DISCONTINUED | OUTPATIENT
Start: 2020-02-03 | End: 2020-02-07

## 2020-02-03 RX ORDER — MORPHINE SULFATE 50 MG/1
4 CAPSULE, EXTENDED RELEASE ORAL EVERY 4 HOURS
Refills: 0 | Status: DISCONTINUED | OUTPATIENT
Start: 2020-02-03 | End: 2020-02-05

## 2020-02-03 RX ORDER — ENOXAPARIN SODIUM 100 MG/ML
40 INJECTION SUBCUTANEOUS DAILY
Refills: 0 | Status: DISCONTINUED | OUTPATIENT
Start: 2020-02-03 | End: 2020-02-07

## 2020-02-03 RX ORDER — ONDANSETRON 8 MG/1
4 TABLET, FILM COATED ORAL ONCE
Refills: 0 | Status: COMPLETED | OUTPATIENT
Start: 2020-02-03 | End: 2020-02-03

## 2020-02-03 RX ORDER — ACETAMINOPHEN 500 MG
1000 TABLET ORAL ONCE
Refills: 0 | Status: COMPLETED | OUTPATIENT
Start: 2020-02-03 | End: 2020-02-03

## 2020-02-03 RX ORDER — SODIUM CHLORIDE 9 MG/ML
1000 INJECTION, SOLUTION INTRAVENOUS
Refills: 0 | Status: DISCONTINUED | OUTPATIENT
Start: 2020-02-03 | End: 2020-02-03

## 2020-02-03 RX ORDER — AMLODIPINE BESYLATE 2.5 MG/1
10 TABLET ORAL DAILY
Refills: 0 | Status: DISCONTINUED | OUTPATIENT
Start: 2020-02-03 | End: 2020-02-07

## 2020-02-03 RX ORDER — FAMOTIDINE 10 MG/ML
20 INJECTION INTRAVENOUS DAILY
Refills: 0 | Status: DISCONTINUED | OUTPATIENT
Start: 2020-02-03 | End: 2020-02-07

## 2020-02-03 RX ORDER — SODIUM CHLORIDE 9 MG/ML
1000 INJECTION, SOLUTION INTRAVENOUS
Refills: 0 | Status: DISCONTINUED | OUTPATIENT
Start: 2020-02-03 | End: 2020-02-04

## 2020-02-03 RX ORDER — ONDANSETRON 8 MG/1
4 TABLET, FILM COATED ORAL EVERY 8 HOURS
Refills: 0 | Status: DISCONTINUED | OUTPATIENT
Start: 2020-02-03 | End: 2020-02-07

## 2020-02-03 RX ORDER — HYDRALAZINE HCL 50 MG
10 TABLET ORAL ONCE
Refills: 0 | Status: COMPLETED | OUTPATIENT
Start: 2020-02-03 | End: 2020-02-04

## 2020-02-03 RX ORDER — KETOROLAC TROMETHAMINE 30 MG/ML
15 SYRINGE (ML) INJECTION EVERY 6 HOURS
Refills: 0 | Status: DISCONTINUED | OUTPATIENT
Start: 2020-02-03 | End: 2020-02-07

## 2020-02-03 RX ORDER — LISINOPRIL 2.5 MG/1
10 TABLET ORAL DAILY
Refills: 0 | Status: DISCONTINUED | OUTPATIENT
Start: 2020-02-03 | End: 2020-02-04

## 2020-02-03 RX ORDER — METOPROLOL TARTRATE 50 MG
25 TABLET ORAL
Refills: 0 | Status: DISCONTINUED | OUTPATIENT
Start: 2020-02-03 | End: 2020-02-04

## 2020-02-03 RX ORDER — HYDRALAZINE HCL 50 MG
10 TABLET ORAL EVERY 6 HOURS
Refills: 0 | Status: DISCONTINUED | OUTPATIENT
Start: 2020-02-03 | End: 2020-02-03

## 2020-02-03 RX ORDER — KETOROLAC TROMETHAMINE 30 MG/ML
30 SYRINGE (ML) INJECTION ONCE
Refills: 0 | Status: DISCONTINUED | OUTPATIENT
Start: 2020-02-03 | End: 2020-02-04

## 2020-02-03 RX ORDER — ACETAMINOPHEN 500 MG
1000 TABLET ORAL ONCE
Refills: 0 | Status: COMPLETED | OUTPATIENT
Start: 2020-02-03 | End: 2020-02-04

## 2020-02-03 RX ADMIN — Medication 400 MILLIGRAM(S): at 06:20

## 2020-02-03 RX ADMIN — HYDROMORPHONE HYDROCHLORIDE 1 MILLIGRAM(S): 2 INJECTION INTRAMUSCULAR; INTRAVENOUS; SUBCUTANEOUS at 20:55

## 2020-02-03 RX ADMIN — Medication 1.25 MILLIGRAM(S): at 06:24

## 2020-02-03 RX ADMIN — Medication 30 MILLIGRAM(S): at 01:34

## 2020-02-03 RX ADMIN — Medication 10 MILLIGRAM(S): at 20:50

## 2020-02-03 RX ADMIN — Medication 1.25 MILLIGRAM(S): at 22:50

## 2020-02-03 RX ADMIN — HYDROMORPHONE HYDROCHLORIDE 1 MILLIGRAM(S): 2 INJECTION INTRAMUSCULAR; INTRAVENOUS; SUBCUTANEOUS at 21:51

## 2020-02-03 RX ADMIN — Medication 400 MILLIGRAM(S): at 17:50

## 2020-02-03 RX ADMIN — SODIUM CHLORIDE 100 MILLILITER(S): 9 INJECTION, SOLUTION INTRAVENOUS at 21:13

## 2020-02-03 RX ADMIN — HYDROMORPHONE HYDROCHLORIDE 1 MILLIGRAM(S): 2 INJECTION INTRAMUSCULAR; INTRAVENOUS; SUBCUTANEOUS at 21:12

## 2020-02-03 RX ADMIN — Medication 1000 MILLIGRAM(S): at 18:50

## 2020-02-03 RX ADMIN — Medication 400 MILLIGRAM(S): at 10:36

## 2020-02-03 RX ADMIN — Medication 5 MILLIGRAM(S): at 01:03

## 2020-02-03 RX ADMIN — Medication 30 MILLIGRAM(S): at 01:04

## 2020-02-03 RX ADMIN — HYDROMORPHONE HYDROCHLORIDE 1 MILLIGRAM(S): 2 INJECTION INTRAMUSCULAR; INTRAVENOUS; SUBCUTANEOUS at 21:05

## 2020-02-03 RX ADMIN — HYDROMORPHONE HYDROCHLORIDE 1 MILLIGRAM(S): 2 INJECTION INTRAMUSCULAR; INTRAVENOUS; SUBCUTANEOUS at 21:45

## 2020-02-03 RX ADMIN — Medication 5 MILLIGRAM(S): at 06:21

## 2020-02-03 RX ADMIN — FAMOTIDINE 20 MILLIGRAM(S): 10 INJECTION INTRAVENOUS at 13:40

## 2020-02-03 RX ADMIN — Medication 1000 MILLIGRAM(S): at 11:36

## 2020-02-03 RX ADMIN — Medication 5 MILLIGRAM(S): at 00:08

## 2020-02-03 RX ADMIN — Medication 1000 MILLIGRAM(S): at 06:50

## 2020-02-03 RX ADMIN — ONDANSETRON 4 MILLIGRAM(S): 8 TABLET, FILM COATED ORAL at 21:49

## 2020-02-03 RX ADMIN — SODIUM CHLORIDE 1000 MILLILITER(S): 9 INJECTION, SOLUTION INTRAVENOUS at 23:41

## 2020-02-03 NOTE — CONSULT NOTE ADULT - SUBJECTIVE AND OBJECTIVE BOX
CHIEF COMPLAINT: Patient is a 59y old  Male who presents with a chief complaint of abdominal pain (03 Feb 2020 13:03)      HPI:  59M seen and examined in the ed who presented with complaints of epigastric pain and chest pain. Patient states he had done cocaine about 48 hours ago and his po intake in last 24 hour had been excessive. Patient states due to worsening epigastric discomfort he decided to come to the emergency room. Denies any nausea or vomiting currently but does claim to have epigastric discomfort. Denies any similar episodes in the past. No fevers or chills reported. Patient reports of having one episode of vomiting prior to coming to the hospital.     PMhx : htn, hld ( denies taking home bp meds)   PSHx : left inguinal hernia repair (20 yrs ago) , back surgery     ICU Vital Signs Last 24 Hrs  T(C): 36.4 (02 Feb 2020 11:33), Max: 36.4 (02 Feb 2020 08:46)  T(F): 97.5 (02 Feb 2020 11:33), Max: 97.6 (02 Feb 2020 08:46)  HR: 75 (02 Feb 2020 11:33) (70 - 90)  BP: 156/104 (02 Feb 2020 11:33) (156/104 - 222/123)  BP(mean): 119 (02 Feb 2020 11:33) (119 - 149)  ABP: --  ABP(mean): --  RR: 16 (02 Feb 2020 11:33) (15 - 19)  SpO2: 100% (02 Feb 2020 11:33) (95% - 100%)                          14.3   4.18  )-----------( 220      ( 02 Feb 2020 08:45 )             42.9     02-02    139  |  106  |  14  ----------------------------<  105<H>  3.9   |  29  |  1.20    Ca    8.9      02 Feb 2020 08:45    TPro  7.4  /  Alb  3.6  /  TBili  0.2  /  DBili  x   /  AST  30  /  ALT  41  /  AlkPhos  84  02-02    Physical exam:     gen : ao x3  pulm: equal air entry bialterally  cv : [id number]  abdomen: soft, distended, tender to palpation in the epigastric area, no hernia,   old surgical scar visible in the left inguinal hernia (02 Feb 2020 13:28)      PMHx: PAST MEDICAL & SURGICAL HISTORY:  HLD (hyperlipidemia)  HTN (hypertension)  History of back surgery        Soc Hx:  cocaine use       Allergies: Allergies    Allergy Status Unknown    Intolerances          REVIEW OF SYSTEMS:    CONSTITUTIONAL: No weakness, fevers or chills  EYES/ENT: No visual changes;  No vertigo or throat pain   NECK: No pain or stiffness  RESPIRATORY: No cough, wheezing, hemoptysis; No shortness of breath  CARDIOVASCULAR: No chest pain or palpitations  GASTROINTESTINAL: No abdominal or epigastric pain. No nausea, vomiting, or hematemesis; No diarrhea or constipation. No melena or hematochezia.  GENITOURINARY: No dysuria, frequency or hematuria  NEUROLOGICAL: No numbness or weakness  SKIN: No itching, burning, rashes, or lesions   All other review of systems is negative unless indicated above    Vital Signs Last 24 Hrs  T(C): 36.7 (03 Feb 2020 11:38), Max: 37.1 (03 Feb 2020 00:53)  T(F): 98.1 (03 Feb 2020 11:38), Max: 98.8 (03 Feb 2020 00:53)  HR: 74 (03 Feb 2020 11:38) (74 - 94)  BP: 184/81 (03 Feb 2020 11:38) (171/58 - 200/86)  BP(mean): --  RR: 18 (02 Feb 2020 22:05) (18 - 20)  SpO2: 100% (03 Feb 2020 11:38) (94% - 100%)    I&O's Summary          PHYSICAL EXAM:   Constitutional: NAD, awake and alert, well-developed  HEENT: PERR, EOMI, Normal Hearing, MMM  Neck: Soft and supple, No LAD, No JVD  Respiratory: Breath sounds are clear bilaterally, No wheezing, rales or rhonchi  Cardiovascular: S1 and S2, regular rate and rhythm, no Murmurs, gallops or rubs  Gastrointestinal: Bowel Sounds present, soft, nontender, nondistended, no guarding, no rebound  Extremities: No peripheral edema  Vascular: 2+ peripheral pulses  Neurological: A/O x 3, no focal deficits  Musculoskeletal: 5/5 strength b/l upper and lower extremities  Skin: No rashes    MEDICATIONS:  MEDICATIONS  (STANDING):  acetaminophen  IVPB .. 1000 milliGRAM(s) IV Intermittent once  amLODIPine   Tablet 10 milliGRAM(s) Oral daily  atorvastatin 80 milliGRAM(s) Oral at bedtime  enoxaparin Injectable 40 milliGRAM(s) SubCutaneous daily  famotidine Injectable 20 milliGRAM(s) IV Push daily  lactated ringers. 1000 milliLiter(s) (100 mL/Hr) IV Continuous <Continuous>  lisinopril 10 milliGRAM(s) Oral daily  metoprolol tartrate 25 milliGRAM(s) Oral two times a day      LABS: All Labs Reviewed:                        14.5   7.27  )-----------( 233      ( 03 Feb 2020 07:24 )             43.1     02-03    139  |  105  |  10  ----------------------------<  117<H>  3.5   |  26  |  1.06    Ca    9.1      03 Feb 2020 07:24    TPro  7.2  /  Alb  3.4  /  TBili  0.6  /  DBili  x   /  AST  24  /  ALT  29  /  AlkPhos  75  02-03    PT/INR - ( 02 Feb 2020 08:45 )   PT: 11.1 sec;   INR: 1.00 ratio         PTT - ( 02 Feb 2020 08:45 )  PTT:33.6 sec  CARDIAC MARKERS ( 03 Feb 2020 07:24 )  0.031 ng/mL / x     / x     / x     / x      CARDIAC MARKERS ( 02 Feb 2020 10:44 )  <0.015 ng/mL / x     / x     / x     / x      CARDIAC MARKERS ( 02 Feb 2020 08:45 )  <0.015 ng/mL / x     / x     / x     / x          RADIOLOGY:  < from: CT Angio Abdomen and Pelvis w/ IV Cont (02.02.20 @ 09:15) >    PROCEDURE:   CT Angiography of the Chest, Abdomen and Pelvis.   Precontrast imaging was performed through the chest followed by arterial phase imaging of the chest, abdomen and pelvis.  Intravenous contrast: 90 ml Omnipaque 350. 10 ml discarded.  Oral contrast: None.  Sagittal and coronal reformats were performed as well as 3D (MIP) reconstructions.    FINDINGS:    CHEST:     LUNGS AND LARGE AIRWAYS: Patent central airways. There is posterior dependent and areas of subsegmental atelectasis. Mild changes of centrilobular emphysema.  PLEURA: No pleural effusion.  VESSELS: The thoracic aorta and main pulmonary artery are normal in caliber. There is no aortic dissection. There is no central pulmonary embolism.  HEART: Heart size is normal. No pericardial effusion.  MEDIASTINUM AND KAUSHIK: Residual thymic tissue in the anterior mediastinum at the level of the aortic arch.  CHEST WALL AND LOWER NECK: The thyroid gland is within normal limits. There is no supraclavicular or axillary lymphadenopathy.    ABDOMEN AND PELVIS:    LIVER: Subcentimeter enhancing focus in the left hepatic lobe on image #96/series 3 likely represents a flash filling hemangioma.  BILE DUCTS: Normal caliber.  GALLBLADDER: Within normal limits.  SPLEEN: Within normal limits.  PANCREAS: Within normal limits.  ADRENALS: Within normal limits.  KIDNEYS/URETERS: No renal stones or hydronephrosis. Symmetric enhancement.    BLADDER: Within normal limits.  REPRODUCTIVE ORGANS: Prostate is enlarged.    BOWEL: There are fluid-filled mildly dilated small bowel loops in the left hemiabdomen, there are 2 transition points in the mid abdomen as seen on image #177 and 171/series 3 and image #36 and 34 on [email or web address]. There is groundglass attenuation of the mesentery at this location with mild thickening of the fluid-filled bowel loops. These findings are concerning for small bowel obstruction, secondary to an internal hernia. No bowel wall pneumatosis. Appendix is within normal limits.  PERITONEUM: No ascites.  VESSELS: The aorta is normal in caliber. There is no aortic dissection. There is scattered arthrosclerosis of the aorta. The mesenteric arteries are patent. There is no evidence for mesenteric venous or portal venous gas.  RETROPERITONEUM/LYMPH NODES: No lymphadenopathy.    ABDOMINAL WALL: Within normal limits.  BONES: Multilevel degenerative change of the thoracolumbar spine.    IMPRESSION:     No aortic dissection in the thorax or abdomen.    What likely represents early developing small bowel obstruction secondary to an internal hernia is suspected with details as above.    Findings were discussed with Dr. FRANCINE ARRIAZA [phone] 2/2/2020 9:31 AM by Dr. Osman with read back confirmation.      ROLANDA OSMAN M.D. ATTENDING RADIOLOGIST  This document has been electronically signed. Feb 2 2020  9:31AM    < end of copied text >    EKG: SR with LVH and repol changes     Telemetry: SR/ST    ECHO: prelin normal LV function mild to mod LVH

## 2020-02-03 NOTE — PROGRESS NOTE ADULT - ASSESSMENT
#Abdominal Pain 2ndry to small bowel obstruction on CTA  - Management as per surgery  - manage sbo conservatively  - no immediate surgical intervention necessary at this point.   - ngt placed back in  - Pain Management  - serial abdominal exams  - IVF    #Abnormal EKG with Cocaine Use  -Troponin X 2 neg  -recommend Cardio consult   -FU ECHO    #Uncontrolled Hypertension most likley 2ndry to noncompliance, Acute Pain and Cocaine use   -hold oral BP meds - Norvasc and Lisinopril  -continue IV enalaprilat and IV hydralazine while on NGT  -cardio consult    #DVT ppx

## 2020-02-03 NOTE — PROGRESS NOTE ADULT - SUBJECTIVE AND OBJECTIVE BOX
CC:Patient is a 59y old  Male who presents with a chief complaint of abdominal pain (03 Feb 2020 13:03)      Subjective:  Pt seen and examined at bedside with chaperone. Pt is AAOx3, pt in no acute distress. Pt states c/o severe mid low abd pain at time of exam, pt states has been waxing/waning all morning. Pt denied c/o fever, chills, chest pain, SOB, N/V/D, extremity pain or dysfunction, hemoptysis, hematemesis, hematuria, hematochexia, headache, diplopia, vertigo, dizzyness. Pt states (+) void, no bowel function of flatus or bm    ROS:  otherwise as abovementioned ROS    Vital Signs Last 24 Hrs  T(C): 36.7 (03 Feb 2020 11:38), Max: 37.1 (03 Feb 2020 00:53)  T(F): 98.1 (03 Feb 2020 11:38), Max: 98.8 (03 Feb 2020 00:53)  HR: 74 (03 Feb 2020 11:38) (74 - 94)  BP: 184/81 (03 Feb 2020 11:38) (171/58 - 200/86)  BP(mean): --  RR: 18 (02 Feb 2020 22:05) (18 - 20)  SpO2: 100% (03 Feb 2020 11:38) (94% - 100%)    Labs:      CARDIAC MARKERS ( 03 Feb 2020 07:24 )  0.031 ng/mL / x     / x     / x     / x      CARDIAC MARKERS ( 02 Feb 2020 10:44 )  <0.015 ng/mL / x     / x     / x     / x      CARDIAC MARKERS ( 02 Feb 2020 08:45 )  <0.015 ng/mL / x     / x     / x     / x                                14.5   7.27  )-----------( 233      ( 03 Feb 2020 07:24 )             43.1     CBC Full  -  ( 03 Feb 2020 07:24 )  WBC Count : 7.27 K/uL  RBC Count : 4.89 M/uL  Hemoglobin : 14.5 g/dL  Hematocrit : 43.1 %  Platelet Count - Automated : 233 K/uL  Mean Cell Volume : 88.1 fl  Mean Cell Hemoglobin : 29.7 pg  Mean Cell Hemoglobin Concentration : 33.6 gm/dL  Auto Neutrophil # : x  Auto Lymphocyte # : x  Auto Monocyte # : x  Auto Eosinophil # : x  Auto Basophil # : x  Auto Neutrophil % : x  Auto Lymphocyte % : x  Auto Monocyte % : x  Auto Eosinophil % : x  Auto Basophil % : x    02-03    139  |  105  |  10  ----------------------------<  117<H>  3.5   |  26  |  1.06    Ca    9.1      03 Feb 2020 07:24    TPro  7.2  /  Alb  3.4  /  TBili  0.6  /  DBili  x   /  AST  24  /  ALT  29  /  AlkPhos  75  02-03    LIVER FUNCTIONS - ( 03 Feb 2020 07:24 )  Alb: 3.4 g/dL / Pro: 7.2 gm/dL / ALK PHOS: 75 U/L / ALT: 29 U/L / AST: 24 U/L / GGT: x           PT/INR - ( 02 Feb 2020 08:45 )   PT: 11.1 sec;   INR: 1.00 ratio         PTT - ( 02 Feb 2020 08:45 )  PTT:33.6 sec      Meds:  amLODIPine   Tablet 10 milliGRAM(s) Oral daily  atorvastatin 80 milliGRAM(s) Oral at bedtime  enalaprilat Injectable 1.25 milliGRAM(s) IV Push every 6 hours PRN  enoxaparin Injectable 40 milliGRAM(s) SubCutaneous daily  famotidine Injectable 20 milliGRAM(s) IV Push daily  hydrALAZINE Injectable 10 milliGRAM(s) IV Push every 6 hours PRN  ketorolac   Injectable 15 milliGRAM(s) IV Push every 6 hours PRN  lactated ringers. 1000 milliLiter(s) IV Continuous <Continuous>  lisinopril 10 milliGRAM(s) Oral daily  metoprolol tartrate 25 milliGRAM(s) Oral two times a day      Radiology:  < from: CT Angio Abdomen and Pelvis w/ IV Cont (02.02.20 @ 09:15) >  EXAM:  CT ANGIO ABD PELVIS (W)AW IC                          EXAM:  CTA CHEST DISSECTION (W)AW IC                            PROCEDURE DATE:  02/02/2020          INTERPRETATION:  CLINICAL INFORMATION: Chest pain and left upper quadrant pain. Suspected aortic dissection.    COMPARISON: CT abdomen pelvis 12/9/2019.    PROCEDURE:   CT Angiography of the Chest, Abdomen and Pelvis.   Precontrast imaging was performed through the chest followed by arterial phase imaging of the chest, abdomen and pelvis.  Intravenous contrast: 90 ml Omnipaque 350. 10 ml discarded.  Oral contrast: None.  Sagittal and coronal reformats were performed as well as 3D (MIP) reconstructions.    FINDINGS:    CHEST:     LUNGS AND LARGE AIRWAYS: Patent central airways. There is posterior dependent and areas of subsegmental atelectasis. Mild changes of centrilobular emphysema.  PLEURA: No pleural effusion.  VESSELS: The thoracic aorta and main pulmonary artery are normal in caliber. There is no aortic dissection. There is no central pulmonary embolism.  HEART: Heart size is normal. No pericardial effusion.  MEDIASTINUM AND KAUSHIK: Residual thymic tissue in the anterior mediastinum at the level of the aortic arch.  CHEST WALL AND LOWER NECK: The thyroid gland is within normal limits. There is no supraclavicular or axillary lymphadenopathy.    ABDOMEN AND PELVIS:    LIVER: Subcentimeter enhancing focus in the left hepatic lobe on image #96/series 3 likely represents a flash filling hemangioma.  BILE DUCTS: Normal caliber.  GALLBLADDER: Within normal limits.  SPLEEN: Within normal limits.  PANCREAS: Within normal limits.  ADRENALS: Within normal limits.  KIDNEYS/URETERS: No renal stones or hydronephrosis. Symmetric enhancement.    BLADDER: Within normal limits.  REPRODUCTIVE ORGANS: Prostate is enlarged.    BOWEL: There are fluid-filled mildly dilated small bowel loops in the left hemiabdomen, there are 2 transition points in the mid abdomen as seen on image #177 and 171/series 3 and image #36 and 34 on [email or web address]. There is groundglass attenuation of the mesentery at this location with mild thickening of the fluid-filled bowel loops. These findings are concerning for small bowel obstruction, secondary to an internal hernia. No bowel wall pneumatosis. Appendix is within normal limits.  PERITONEUM: No ascites.  VESSELS: The aorta is normal in caliber. There is no aortic dissection. There is scattered arthrosclerosis of the aorta. The mesenteric arteries are patent. There is no evidence for mesenteric venous or portal venous gas.  RETROPERITONEUM/LYMPH NODES: No lymphadenopathy.    ABDOMINAL WALL: Within normal limits.  BONES: Multilevel degenerative change of the thoracolumbar spine.    IMPRESSION:     No aortic dissection in the thorax or abdomen.    What likely represents early developing small bowel obstruction secondary to an internal hernia is suspected with details as above.    Findings were discussed with Dr. FRANCINE ARRIAZA [phone] 2/2/2020 9:31 AM by Dr. Osman with read back confirmation.                  ROLANDA OSMAN M.D. ATTENDING RADIOLOGIST  This document has been electronically signed. Feb 2 2020  9:31AM    < end of copied text >      Physical exam:  Pt is aaox3  Pt in no acute distress  Psych: normal affect  Resp: CTAB  CVS: S1S2(+)  ABD: bowel sounds (+), + distended, + rebound, no guarding, + rigidity, no skin changes to exam. (+) diffuse tenderness to exam, worse in lower quadrants b/l. NGT demonstrates bilious output  EXT: no calf tenderness or edema to exam b/l, on VTE prophylaxis  Skin: no adverse skin changes to exam

## 2020-02-03 NOTE — BRIEF OPERATIVE NOTE - NSICDXBRIEFPROCEDURE_GEN_ALL_CORE_FT
PROCEDURES:  Internal hernia repair 03-Feb-2020 20:41:52  Danya Vaz  Lysis of peritoneal adhesions 03-Feb-2020 20:41:28  Danya Vaz

## 2020-02-03 NOTE — PROGRESS NOTE ADULT - ATTENDING COMMENTS
Pt seen and examined at bedside with chaperone. Pt is AAOx3, pt in no acute distress. Pt has small bowel obstruction with internal hernia. Pt explained the need for emergent surgical intervention and the procedures in both medical terminology and in lay terms that the patient understood, exploratory laparotomy, possible bowel resection, possible ostomy. Pt explained in both medical terminology and in lay terms that the patient understood, the benefits and alternatives of surgery including non-operative management. Pt explained at length in both medical terminology and in lay terms that the patient understood, the associated risks of surgery including but not limited to infection, bleeding, nerve/organ injury, postoperative pain, poor wound healing, scar formation both internally and externally, risk of seroma/hematoma/abcess formation, risk of hernia development, risk of anastamotic leak or breakdown, risk of need for further GI/IR/Surgery intervention as required. Pt explained in both medical terminology and in lay terms that the patient understood, the associated HIGH ally and post operative risks of cardiac/cns/respiratory insult or injury, risk of death, pt has h/o cocaine use, recent cocaine use. Pt understood all of the above. All questions were answered. Pt gave informed consent for surgery.

## 2020-02-03 NOTE — BRIEF OPERATIVE NOTE - OPERATION/FINDINGS
omentum band adhesion causing internal hernia and small bowel closed loop obstruction, ALEXIS performed   All bowel viable, patent, intact, without bowel integrity compromise post reduction

## 2020-02-03 NOTE — PROGRESS NOTE ADULT - SUBJECTIVE AND OBJECTIVE BOX
HOSPITALIST PROGRESS NOTE:  SUBJECTIVE:  PCP:  Chief Complaint: Patient is a 59y old  Male who presents with a chief complaint of SBO (02 Feb 2020 13:28)      HPI:  The patient  is a 60 yo male with Hx. of HTN,HLD,  Cocaine abuse who lives in [place] and came to visit his mother when he developed vomiting x1 and LUQ pain radiating into his back.   Had EKG done which showed ST elev. in ant leads.Trop <0.015 neg, repeat EKG no change.  CTA abdo shows SBO. Had NGT placed,  had surgical consult, had Cardiology cons. Dr. Hobbs. no signs of ACS  2/3: Above reviewed. overnight patient pulled out NGT as per the staff; This morning he was on his knees and hands complaining of lower abd pain;  patient also had uncontrolled BP overnight    Allergies:  Allergy Status Unknown    REVIEW OF SYSTEMS:  See HPI. All other review of systems is negative unless indicated above.     OBJECTIVE  Physical Exam:  Vital Signs:    Weight (kg): 79.8 (02-02 @ 15:51)  Vital Signs Last 24 Hrs  T(C): 36.7 (03 Feb 2020 11:38), Max: 37.1 (03 Feb 2020 00:53)  T(F): 98.1 (03 Feb 2020 11:38), Max: 98.8 (03 Feb 2020 00:53)  HR: 74 (03 Feb 2020 11:38) (74 - 94)  BP: 184/81 (03 Feb 2020 11:38) (171/58 - 200/86)  BP(mean): --  RR: 18 (02 Feb 2020 22:05) (18 - 20)  SpO2: 100% (03 Feb 2020 11:38) (94% - 100%)  I&O's Summary      Constitutional: NAD, awake and alert, well-developed  Neurological: AAO x 3, no focal deficits  HEENT: PERRLA, EOMI, MMM  Neck: Soft and supple, No LAD, No JVD  Respiratory: Breath sounds are clear bilaterally, No wheezing, rales or rhonchi  Cardiovascular: S1 and S2, regular rate and rhythm; no Murmurs, gallops or rubs  Gastrointestinal: Bowel Sounds present, soft, nontender, nondistended, no guarding, no rebound tenderness  Back: No CVA tenderness   Extremities: No peripheral edema  Vascular: 2+ peripheral pulses  Musculoskeletal: 5/5 strength b/l upper and lower extremities  Skin: No rashes  Breast: Deferred  Rectal: Deferred    MEDICATIONS  (STANDING):  amLODIPine   Tablet 10 milliGRAM(s) Oral daily  atorvastatin 80 milliGRAM(s) Oral at bedtime  enoxaparin Injectable 40 milliGRAM(s) SubCutaneous daily  famotidine Injectable 20 milliGRAM(s) IV Push daily  lactated ringers. 1000 milliLiter(s) (100 mL/Hr) IV Continuous <Continuous>  lisinopril 10 milliGRAM(s) Oral daily  metoprolol tartrate 25 milliGRAM(s) Oral two times a day      LABS: All Labs Reviewed:                        14.5   7.27  )-----------( 233      ( 03 Feb 2020 07:24 )             43.1     02-03    139  |  105  |  10  ----------------------------<  117<H>  3.5   |  26  |  1.06    Ca    9.1      03 Feb 2020 07:24    TPro  7.2  /  Alb  3.4  /  TBili  0.6  /  DBili  x   /  AST  24  /  ALT  29  /  AlkPhos  75  02-03    PT/INR - ( 02 Feb 2020 08:45 )   PT: 11.1 sec;   INR: 1.00 ratio         PTT - ( 02 Feb 2020 08:45 )  PTT:33.6 sec  CARDIAC MARKERS ( 02 Feb 2020 10:44 )  <0.015 ng/mL / x     / x     / x     / x      CARDIAC MARKERS ( 02 Feb 2020 08:45 )  <0.015 ng/mL / x     / x     / x     / x          RADIOLOGY/EKG:      < from: Xray Chest 1 View-PORTABLE IMMEDIATE (02.03.20 @ 10:24) >    IMPRESSION: NG tube in place.    < end of copied text >    < from: CT Angio Abdomen and Pelvis w/ IV Cont (02.02.20 @ 09:15) >    IMPRESSION:     No aortic dissection in the thorax or abdomen.    What likely represents early developing small bowel obstruction secondary to an internal hernia is suspected with details as above.      < end of copied text >

## 2020-02-03 NOTE — CONSULT NOTE ADULT - ASSESSMENT
59 M  no cardiac contraindication to having surgery to resolve SBO. Patient has EKG revealing LVH ( not STEMI) with repol changes.  Cardiac enzymes are negative x 3 sets. Echocardiogram reveals  normal LV function with  LVH.    It is recommended that beta-blockers are avoided due to concern of recent use of cocaine.       as he is NPO, can use IV hydralazine, enalapril or nitropaste for BP     will follow as needed

## 2020-02-03 NOTE — PROGRESS NOTE ADULT - ASSESSMENT
A/P:  SBO  Internal hernia on CT  Worsening presenting symptoms at exam 2/3/20, peritonitis  NGT decompression  IV antibiotics  Cardiology consult  H/O recent cocaine use  NPO, IV hydration  GI/DVT prophylaxis  Pain control  Incentive spirometry  Serial abd exams  F/U labs  Pt for surgical intervention of exploratory laparotomy  Pt aware of and agrees with all of the above

## 2020-02-04 LAB
ANION GAP SERPL CALC-SCNC: 4 MMOL/L — LOW (ref 5–17)
BUN SERPL-MCNC: 9 MG/DL — SIGNIFICANT CHANGE UP (ref 7–23)
CALCIUM SERPL-MCNC: 8.9 MG/DL — SIGNIFICANT CHANGE UP (ref 8.5–10.1)
CHLORIDE SERPL-SCNC: 106 MMOL/L — SIGNIFICANT CHANGE UP (ref 96–108)
CO2 SERPL-SCNC: 27 MMOL/L — SIGNIFICANT CHANGE UP (ref 22–31)
CREAT SERPL-MCNC: 0.92 MG/DL — SIGNIFICANT CHANGE UP (ref 0.5–1.3)
GLUCOSE SERPL-MCNC: 100 MG/DL — HIGH (ref 70–99)
HCT VFR BLD CALC: 39.6 % — SIGNIFICANT CHANGE UP (ref 39–50)
HGB BLD-MCNC: 13 G/DL — SIGNIFICANT CHANGE UP (ref 13–17)
MAGNESIUM SERPL-MCNC: 1.8 MG/DL — SIGNIFICANT CHANGE UP (ref 1.6–2.6)
MCHC RBC-ENTMCNC: 29.6 PG — SIGNIFICANT CHANGE UP (ref 27–34)
MCHC RBC-ENTMCNC: 32.8 GM/DL — SIGNIFICANT CHANGE UP (ref 32–36)
MCV RBC AUTO: 90.2 FL — SIGNIFICANT CHANGE UP (ref 80–100)
PHOSPHATE SERPL-MCNC: 3.6 MG/DL — SIGNIFICANT CHANGE UP (ref 2.5–4.5)
PLATELET # BLD AUTO: 205 K/UL — SIGNIFICANT CHANGE UP (ref 150–400)
POTASSIUM SERPL-MCNC: 4 MMOL/L — SIGNIFICANT CHANGE UP (ref 3.5–5.3)
POTASSIUM SERPL-SCNC: 4 MMOL/L — SIGNIFICANT CHANGE UP (ref 3.5–5.3)
RBC # BLD: 4.39 M/UL — SIGNIFICANT CHANGE UP (ref 4.2–5.8)
RBC # FLD: 13.6 % — SIGNIFICANT CHANGE UP (ref 10.3–14.5)
SODIUM SERPL-SCNC: 137 MMOL/L — SIGNIFICANT CHANGE UP (ref 135–145)
WBC # BLD: 11.96 K/UL — HIGH (ref 3.8–10.5)
WBC # FLD AUTO: 11.96 K/UL — HIGH (ref 3.8–10.5)

## 2020-02-04 PROCEDURE — 99232 SBSQ HOSP IP/OBS MODERATE 35: CPT

## 2020-02-04 PROCEDURE — 99024 POSTOP FOLLOW-UP VISIT: CPT

## 2020-02-04 RX ORDER — ACETAMINOPHEN 500 MG
1000 TABLET ORAL ONCE
Refills: 0 | Status: COMPLETED | OUTPATIENT
Start: 2020-02-04 | End: 2020-02-04

## 2020-02-04 RX ORDER — ACETAMINOPHEN 500 MG
1000 TABLET ORAL ONCE
Refills: 0 | Status: COMPLETED | OUTPATIENT
Start: 2020-02-04 | End: 2020-02-05

## 2020-02-04 RX ORDER — DEXTROSE MONOHYDRATE, SODIUM CHLORIDE, AND POTASSIUM CHLORIDE 50; .745; 4.5 G/1000ML; G/1000ML; G/1000ML
1000 INJECTION, SOLUTION INTRAVENOUS
Refills: 0 | Status: DISCONTINUED | OUTPATIENT
Start: 2020-02-04 | End: 2020-02-06

## 2020-02-04 RX ORDER — LISINOPRIL 2.5 MG/1
20 TABLET ORAL DAILY
Refills: 0 | Status: DISCONTINUED | OUTPATIENT
Start: 2020-02-04 | End: 2020-02-06

## 2020-02-04 RX ADMIN — Medication 1.25 MILLIGRAM(S): at 04:59

## 2020-02-04 RX ADMIN — Medication 30 MILLIGRAM(S): at 21:32

## 2020-02-04 RX ADMIN — Medication 10 MILLIGRAM(S): at 18:25

## 2020-02-04 RX ADMIN — DEXTROSE MONOHYDRATE, SODIUM CHLORIDE, AND POTASSIUM CHLORIDE 100 MILLILITER(S): 50; .745; 4.5 INJECTION, SOLUTION INTRAVENOUS at 13:13

## 2020-02-04 RX ADMIN — FAMOTIDINE 20 MILLIGRAM(S): 10 INJECTION INTRAVENOUS at 13:14

## 2020-02-04 RX ADMIN — Medication 10 MILLIGRAM(S): at 22:08

## 2020-02-04 RX ADMIN — Medication 1000 MILLIGRAM(S): at 05:28

## 2020-02-04 RX ADMIN — Medication 400 MILLIGRAM(S): at 04:58

## 2020-02-04 RX ADMIN — PANTOPRAZOLE SODIUM 40 MILLIGRAM(S): 20 TABLET, DELAYED RELEASE ORAL at 13:14

## 2020-02-04 RX ADMIN — Medication 30 MILLIGRAM(S): at 20:20

## 2020-02-04 RX ADMIN — ENOXAPARIN SODIUM 40 MILLIGRAM(S): 100 INJECTION SUBCUTANEOUS at 13:13

## 2020-02-04 RX ADMIN — ONDANSETRON 4 MILLIGRAM(S): 8 TABLET, FILM COATED ORAL at 05:09

## 2020-02-04 RX ADMIN — Medication 400 MILLIGRAM(S): at 13:13

## 2020-02-04 RX ADMIN — Medication 1000 MILLIGRAM(S): at 14:13

## 2020-02-04 NOTE — PROGRESS NOTE ADULT - ASSESSMENT
#Abdominal Pain 2ndry to small bowel obstruction/Obstructed Internal Hernia Repair S/P Internal hernia repair and Lysis of peritoneal adhesions 2/3  #Lactic Acidosis most likely 2ndry to above, Now resolved   - Management as per surgery  - continue ngt   - Pain Management  - serial abdominal exams  - IVF    #Abnormal EKG with Cocaine Use  -Recommend Transfer to Med surg  -Troponin X 2 neg  -Cardio consult appreciated   -ECHO  Estimated left ventricular ejection fraction is 60-65 %.  -Avoid BB due to hx of cocaine use     #Uncontrolled Hypertension most likley 2ndry to noncompliance, Acute Pain and Cocaine use   -hold oral BP meds - Norvasc and Lisinopril  -continue IV enalaprilat and IV hydralazine while on NGT  -cardio consult appreciated   -Avoid BB due to Hx of cocaine use     #DVT ppx

## 2020-02-04 NOTE — PROGRESS NOTE ADULT - SUBJECTIVE AND OBJECTIVE BOX
Patient is a 59y old  Male who presents with a chief complaint of abdominal pain (04 Feb 2020 07:39)      HPI:  59M seen and examined in the ed who presented with complaints of epigastric pain and chest pain. Patient states he had done cocaine about 48 hours ago and his po intake in last 24 hour had been excessive. Patient states due to worsening epigastric discomfort he decided to come to the emergency room. Denies any nausea or vomiting currently but does claim to have epigastric discomfort. Denies any similar episodes in the past. No fevers or chills reported. Patient reports of having one episode of vomiting prior to coming to the hospital.   2/4: Pt seen and examined, pain moderately controlled,  no nausea, no fever, no GI function. NGT bilious.   ROS:.  [X] A ten-point review of systems was otherwise negative except as noted.  Systemic:	[ ] Fever	[ ] Chills	[ ] Night sweats    [ ] Fatigue	[ ] Other  [] Cardiovascular:  [] Pulmonary:  [] Renal/Urologic:  [] Gastrointestinal: abdominal pain, vomiting  [] Metabolic:  [] Neurologic:  [] Hematologic:  [] ENT:  [] Ophthalmologic:  [] Musculoskeletal:    [ ] Due to altered mental status/intubation, subjective information were not able to be obtained from the patient. History was obtained, to the extent possible, from review of the chart and collateral sources of information.    All other system review is negative .  PAST MEDICAL & SURGICAL HISTORY:  HLD (hyperlipidemia)  HTN (hypertension)  History of back surgery    FAMILY HISTORY:    Social History:     Alcohol: Denied  Smoking: Denied  Drug Use: Denied        Vital Signs Last 24 Hrs  T(C): 37.1 (04 Feb 2020 11:21), Max: 37.1 (04 Feb 2020 11:21)  T(F): 98.8 (04 Feb 2020 11:21), Max: 98.8 (04 Feb 2020 11:21)  HR: 75 (04 Feb 2020 11:21) (75 - 89)  BP: 165/84 (04 Feb 2020 11:21) (146/70 - 201/89)  BP(mean): --  RR: 17 (04 Feb 2020 11:21) (14 - 17)  SpO2: 96% (04 Feb 2020 11:21) (94% - 100%)    I&O's Summary    03 Feb 2020 07:01  -  04 Feb 2020 07:00  --------------------------------------------------------  IN: 700 mL / OUT: 115 mL / NET: 585 mL    04 Feb 2020 07:01  -  04 Feb 2020 13:43  --------------------------------------------------------  IN: 0 mL / OUT: 1100 mL / NET: -1100 mL        PHYSICAL EXAM:  Constitutional: NAD, GCS: 15/15  AOX3  Eyes:  WNL  ENMT:  WNL  Neck:  WNL, non tender  Back: Non tender  Respiratory: CTABL  Cardiovascular:  S1+S2+0  Gastrointestinal: Soft, mild distention, appropriately tender, dressing clean intact,   Genitourinary:  WNL  Extremities: NV intact  Vascular:  Intact  Neurological: No focal neurological deficit,  CN, motor and sensory system grossly intact.  Skin: WNL  Musculoskeletal: WNL  Psychiatric: Grossly WNL        Labs:                          13.0   11.96 )-----------( 205      ( 04 Feb 2020 08:12 )             39.6       02-04    137  |  106  |  9   ----------------------------<  100<H>  4.0   |  27  |  0.92    Ca    8.9      04 Feb 2020 08:12  Phos  3.6     02-04  Mg     1.8     02-04    TPro  7.2  /  Alb  3.4  /  TBili  0.6  /  DBili  x   /  AST  24  /  ALT  29  /  AlkPhos  75  02-03

## 2020-02-04 NOTE — CDI QUERY NOTE - NSCDIOTHERTXTBX_GEN_ALL_CORE_HH
Lactate levels= 2.4 > 2.2 > 1.7    Please clarify if these lab values are indicative of a diagnosis  a) Lactic acidosis, now resolved  b) Labs not clinically significant  c) Excessive lacticemia, now resolved  d) Other, please clarify

## 2020-02-04 NOTE — PROGRESS NOTE ADULT - SUBJECTIVE AND OBJECTIVE BOX
CHIEF COMPLAINT: Patient is a 59y old  Male who presents with a chief complaint of abdominal pain (03 Feb 2020 13:03)      HPI:  59M seen and examined in the ed who presented with complaints of epigastric pain and chest pain. Patient states he had done cocaine about 48 hours ago and his po intake in last 24 hour had been excessive. Patient states due to worsening epigastric discomfort he decided to come to the emergency room. Denies any nausea or vomiting currently but does claim to have epigastric discomfort. Denies any similar episodes in the past. No fevers or chills reported. Patient reports of having one episode of vomiting prior to coming to the hospital.       2/4 s/p surgery, tolerated well    PMhx : htn, hld ( denies taking home bp meds)   PSHx : left inguinal hernia repair (20 yrs ago) , back surgery                             14.5   7.27  )-----------( 233      ( 03 Feb 2020 07:24 )             43.1   02-03    139  |  105  |  10  ----------------------------<  117<H>  3.5   |  26  |  1.06    Ca    9.1      03 Feb 2020 07:24    TPro  7.2  /  Alb  3.4  /  TBili  0.6  /  DBili  x   /  AST  24  /  ALT  29  /  AlkPhos  75  02-03        PMHx: PAST MEDICAL & SURGICAL HISTORY:  HLD (hyperlipidemia)  HTN (hypertension)  History of back surgery        Soc Hx:  cocaine use       Allergies: Allergies    Allergy Status Unknown    Intolerances          REVIEW OF SYSTEMS:    CONSTITUTIONAL: No weakness, fevers or chills  EYES/ENT: No visual changes;  No vertigo or throat pain   NECK: No pain or stiffness  RESPIRATORY: No cough, wheezing, hemoptysis; No shortness of breath  CARDIOVASCULAR: No chest pain or palpitations  GASTROINTESTINAL: No abdominal or epigastric pain. No nausea, vomiting, or hematemesis; No diarrhea or constipation. No melena or hematochezia.  GENITOURINARY: No dysuria, frequency or hematuria  NEUROLOGICAL: No numbness or weakness  SKIN: No itching, burning, rashes, or lesions   All other review of systems is negative unless indicated above  ICU Vital Signs Last 24 Hrs  T(C): 36.6 (04 Feb 2020 04:55), Max: 36.7 (03 Feb 2020 11:38)  T(F): 97.9 (04 Feb 2020 04:55), Max: 98.1 (03 Feb 2020 11:38)  HR: 78 (04 Feb 2020 04:55) (74 - 89)  BP: 168/88 (04 Feb 2020 04:55) (146/70 - 201/89)  BP(mean): --  ABP: --  ABP(mean): --  RR: 16 (03 Feb 2020 22:25) (14 - 16)  SpO2: 96% (04 Feb 2020 04:55) (94% - 100%)      I&O's Summary          PHYSICAL EXAM:   Constitutional: NAD, awake and alert, well-developed  HEENT: PERR, EOMI, Normal Hearing, MMM  Neck: Soft and supple, No LAD, No JVD  Respiratory: Breath sounds are clear bilaterally, No wheezing, rales or rhonchi  Cardiovascular: S1 and S2, regular rate and rhythm, no Murmurs, gallops or rubs  Gastrointestinal: tenderness  Extremities: No peripheral edema  Vascular: 2+ peripheral pulses  Neurological: A/O x 3, no focal deficits  Musculoskeletal: 5/5 strength b/l upper and lower extremities  Skin: No rashes    MEDICATIONS  (STANDING):  amLODIPine   Tablet 10 milliGRAM(s) Oral daily  atorvastatin 80 milliGRAM(s) Oral at bedtime  enoxaparin Injectable 40 milliGRAM(s) SubCutaneous daily  famotidine Injectable 20 milliGRAM(s) IV Push daily  hydrALAZINE Injectable 10 milliGRAM(s) IV Push once  lactated ringers. 1000 milliLiter(s) (100 mL/Hr) IV Continuous <Continuous>  lisinopril 10 milliGRAM(s) Oral daily  metoprolol tartrate 25 milliGRAM(s) Oral two times a day  pantoprazole  Injectable 40 milliGRAM(s) IV Push daily        LABS: All Labs Reviewed:                        14.5   7.27  )-----------( 233      ( 03 Feb 2020 07:24 )             43.1     02-03    139  |  105  |  10  ----------------------------<  117<H>  3.5   |  26  |  1.06    Ca    9.1      03 Feb 2020 07:24    TPro  7.2  /  Alb  3.4  /  TBili  0.6  /  DBili  x   /  AST  24  /  ALT  29  /  AlkPhos  75  02-03    PT/INR - ( 02 Feb 2020 08:45 )   PT: 11.1 sec;   INR: 1.00 ratio         PTT - ( 02 Feb 2020 08:45 )  PTT:33.6 sec  CARDIAC MARKERS ( 03 Feb 2020 07:24 )  0.031 ng/mL / x     / x     / x     / x      CARDIAC MARKERS ( 02 Feb 2020 10:44 )  <0.015 ng/mL / x     / x     / x     / x      CARDIAC MARKERS ( 02 Feb 2020 08:45 )  <0.015 ng/mL / x     / x     / x     / x          RADIOLOGY:  < from: CT Angio Abdomen and Pelvis w/ IV Cont (02.02.20 @ 09:15) >    PROCEDURE:   CT Angiography of the Chest, Abdomen and Pelvis.   Precontrast imaging was performed through the chest followed by arterial phase imaging of the chest, abdomen and pelvis.  Intravenous contrast: 90 ml Omnipaque 350. 10 ml discarded.  Oral contrast: None.  Sagittal and coronal reformats were performed as well as 3D (MIP) reconstructions.    FINDINGS:    CHEST:     LUNGS AND LARGE AIRWAYS: Patent central airways. There is posterior dependent and areas of subsegmental atelectasis. Mild changes of centrilobular emphysema.  PLEURA: No pleural effusion.  VESSELS: The thoracic aorta and main pulmonary artery are normal in caliber. There is no aortic dissection. There is no central pulmonary embolism.  HEART: Heart size is normal. No pericardial effusion.  MEDIASTINUM AND KAUSHIK: Residual thymic tissue in the anterior mediastinum at the level of the aortic arch.  CHEST WALL AND LOWER NECK: The thyroid gland is within normal limits. There is no supraclavicular or axillary lymphadenopathy.    ABDOMEN AND PELVIS:    LIVER: Subcentimeter enhancing focus in the left hepatic lobe on image #96/series 3 likely represents a flash filling hemangioma.  BILE DUCTS: Normal caliber.  GALLBLADDER: Within normal limits.  SPLEEN: Within normal limits.  PANCREAS: Within normal limits.  ADRENALS: Within normal limits.  KIDNEYS/URETERS: No renal stones or hydronephrosis. Symmetric enhancement.    BLADDER: Within normal limits.  REPRODUCTIVE ORGANS: Prostate is enlarged.    BOWEL: There are fluid-filled mildly dilated small bowel loops in the left hemiabdomen, there are 2 transition points in the mid abdomen as seen on image #177 and 171/series 3 and image #36 and 34 on [id number]. There is groundglass attenuation of the mesentery at this location with mild thickening of the fluid-filled bowel loops. These findings are concerning for small bowel obstruction, secondary to an internal hernia. No bowel wall pneumatosis. Appendix is within normal limits.  PERITONEUM: No ascites.  VESSELS: The aorta is normal in caliber. There is no aortic dissection. There is scattered arthrosclerosis of the aorta. The mesenteric arteries are patent. There is no evidence for mesenteric venous or portal venous gas.  RETROPERITONEUM/LYMPH NODES: No lymphadenopathy.    ABDOMINAL WALL: Within normal limits.  BONES: Multilevel degenerative change of the thoracolumbar spine.    IMPRESSION:     No aortic dissection in the thorax or abdomen.    What likely represents early developing small bowel obstruction secondary to an internal hernia is suspected with details as above.    Findings were discussed with Dr. FRANCINE ARRIAZA [phone] 2/2/2020 9:31 AM by Dr. Osman with read back confirmation.      ROLANDA OMSAN M.D. ATTENDING RADIOLOGIST  This document has been electronically signed. Feb 2 2020  9:31AM    < end of copied text >    EKG: SR with LVH and repol changes     Telemetry: SR/ST    ECHO: prelin normal LV function mild to mod LVH

## 2020-02-04 NOTE — PROGRESS NOTE ADULT - SUBJECTIVE AND OBJECTIVE BOX
HOSPITALIST PROGRESS NOTE:  SUBJECTIVE:  PCP:  Chief Complaint: Patient is a 59y old  Male who presents with a chief complaint of SBO (02 Feb 2020 13:28)      HPI:  The patient  is a 60 yo male with Hx. of HTN,HLD,  Cocaine abuse who lives in [place] and came to visit his mother when he developed vomiting x1 and LUQ pain radiating into his back.   Had EKG done which showed ST elev. in ant leads.Trop <0.015 neg, repeat EKG no change.  CTA abdo shows SBO. Had NGT placed,  had surgical consult, had Cardiology cons. Dr. Hobbs. no signs of ACS    2/3: Above reviewed. overnight patient pulled out NGT as per the staff; This morning he was on his knees and hands complaining of lower abd pain;  patient also had uncontrolled BP overnight  2/4: BP slightly better but still high; yesterday patient went to the OR; NGT is in and draining    Allergies:  Allergy Status Unknown    REVIEW OF SYSTEMS:  See HPI. All other review of systems is negative unless indicated above.     OBJECTIVE  Physical Exam:  Vital Signs Last 24 Hrs  T(C): 37.1 (04 Feb 2020 11:21), Max: 37.1 (04 Feb 2020 11:21)  T(F): 98.8 (04 Feb 2020 11:21), Max: 98.8 (04 Feb 2020 11:21)  HR: 75 (04 Feb 2020 11:21) (75 - 89)  BP: 165/84 (04 Feb 2020 11:21) (146/70 - 201/89)  BP(mean): --  RR: 17 (04 Feb 2020 11:21) (14 - 17)  SpO2: 96% (04 Feb 2020 11:21) (94% - 100%)      Constitutional: NAD, awake and alert, well-developed  Neurological: AAO x 3, no focal deficits  HEENT: PERRLA, EOMI, MMM  Neck: Soft and supple, No LAD, No JVD  Respiratory: Breath sounds are clear bilaterally, No wheezing, rales or rhonchi  Cardiovascular: S1 and S2, regular rate and rhythm; no Murmurs, gallops or rubs  Gastrointestinal: Bowel Sounds present, soft, nontender, nondistended, no guarding, no rebound tenderness  Back: No CVA tenderness   Extremities: No peripheral edema  Vascular: 2+ peripheral pulses  Musculoskeletal: 5/5 strength b/l upper and lower extremities  Skin: No rashes  Breast: Deferred  Rectal: Deferred    MEDICATIONS  (STANDING):  amLODIPine   Tablet 10 milliGRAM(s) Oral daily  atorvastatin 80 milliGRAM(s) Oral at bedtime  enoxaparin Injectable 40 milliGRAM(s) SubCutaneous daily  famotidine Injectable 20 milliGRAM(s) IV Push daily  lactated ringers. 1000 milliLiter(s) (100 mL/Hr) IV Continuous <Continuous>  lisinopril 10 milliGRAM(s) Oral daily  metoprolol tartrate 25 milliGRAM(s) Oral two times a day    Lab Results:  CBC  CBC Full  -  ( 04 Feb 2020 08:12 )  WBC Count : 11.96 K/uL  RBC Count : 4.39 M/uL  Hemoglobin : 13.0 g/dL  Hematocrit : 39.6 %  Platelet Count - Automated : 205 K/uL  Mean Cell Volume : 90.2 fl  Mean Cell Hemoglobin : 29.6 pg  Mean Cell Hemoglobin Concentration : 32.8 gm/dL  Auto Neutrophil # : x  Auto Lymphocyte # : x  Auto Monocyte # : x  Auto Eosinophil # : x  Auto Basophil # : x  Auto Neutrophil % : x  Auto Lymphocyte % : x  Auto Monocyte % : x  Auto Eosinophil % : x  Auto Basophil % : x    .		Differential:	[] Automated		[] Manual  Chemistry                        13.0   11.96 )-----------( 205      ( 04 Feb 2020 08:12 )             39.6     02-04    137  |  106  |  9   ----------------------------<  100<H>  4.0   |  27  |  0.92    Ca    8.9      04 Feb 2020 08:12  Phos  3.6     02-04  Mg     1.8     02-04    TPro  7.2  /  Alb  3.4  /  TBili  0.6  /  DBili  x   /  AST  24  /  ALT  29  /  AlkPhos  75  02-03    LIVER FUNCTIONS - ( 03 Feb 2020 07:24 )  Alb: 3.4 g/dL / Pro: 7.2 gm/dL / ALK PHOS: 75 U/L / ALT: 29 U/L / AST: 24 U/L / GGT: x             RADIOLOGY/EKG:      < from: Xray Chest 1 View-PORTABLE IMMEDIATE (02.03.20 @ 10:24) >    IMPRESSION: NG tube in place.    < end of copied text >    < from: CT Angio Abdomen and Pelvis w/ IV Cont (02.02.20 @ 09:15) >    IMPRESSION:     No aortic dissection in the thorax or abdomen.    What likely represents early developing small bowel obstruction secondary to an internal hernia is suspected with details as above.      < end of copied text >      < from: Transthoracic Echocardiogram (02.03.20 @ 14:37) >     Impression     Summary     The left ventricle is normal in size, wall motion and contractility.   Mild concentric left ventricular hypertrophyis present.   Estimated left ventricular ejection fraction is 60-65 %.   Normal appearing left atrium.   Normal appearing right atrium.   Normal appearing right ventricle structure and function.   Normal aortic valve structure and function.   EA reversal of the mitral inflow consistent with reduced compliance of the   left ventricle.   Trace mitral regurgitation is present.   Normal appearing tricuspid valve structure and function.   Trace pulmonic valvular regurgitation is present.   Trace pericardial effusion is present.   No evidence of pleural effusion.   All visualized extra cardiac structures appears to be normal.     Signature      < end of copied text >

## 2020-02-04 NOTE — PROGRESS NOTE ADULT - ASSESSMENT
59 M  no cardiac contraindication to having surgery to resolve SBO. Patient has EKG revealing LVH ( not STEMI) with repol changes.  Cardiac enzymes are negative x 3 sets. Echocardiogram reveals  normal LV function with  LVH.    It is recommended that beta-blockers are avoided due to concern of recent use of cocaine.       as he is NPO, can use IV hydralazine, enalapril or nitropaste for BP      2/4 tolerated surgeyr. recommend uptotration of BP meds, recommend avoidance of Betablockers due to potential recent cocaine use-

## 2020-02-04 NOTE — PROGRESS NOTE ADULT - ASSESSMENT
59 y old marti S/P ex lap, ALEXIS, for SBO, internal hernia  POD 1  NPO  Pain control, continue PCA  IV hydration  IV antibiotics  DVT/GI prophylaxis  NGT  D/C [place]  Medical service following for BP control on IV hydralazine, enalapril   Await GI function  Replace electrolyte  PT  OOB, ambulate  incentive spirometry  D/W family, Nursing staff.

## 2020-02-05 DIAGNOSIS — K56.609 UNSPECIFIED INTESTINAL OBSTRUCTION, UNSPECIFIED AS TO PARTIAL VERSUS COMPLETE OBSTRUCTION: ICD-10-CM

## 2020-02-05 LAB
ANION GAP SERPL CALC-SCNC: 5 MMOL/L — SIGNIFICANT CHANGE UP (ref 5–17)
BUN SERPL-MCNC: 7 MG/DL — SIGNIFICANT CHANGE UP (ref 7–23)
CALCIUM SERPL-MCNC: 9.1 MG/DL — SIGNIFICANT CHANGE UP (ref 8.5–10.1)
CHLORIDE SERPL-SCNC: 105 MMOL/L — SIGNIFICANT CHANGE UP (ref 96–108)
CO2 SERPL-SCNC: 26 MMOL/L — SIGNIFICANT CHANGE UP (ref 22–31)
CREAT SERPL-MCNC: 1.01 MG/DL — SIGNIFICANT CHANGE UP (ref 0.5–1.3)
GLUCOSE SERPL-MCNC: 106 MG/DL — HIGH (ref 70–99)
HCT VFR BLD CALC: 38.6 % — LOW (ref 39–50)
HGB BLD-MCNC: 12.5 G/DL — LOW (ref 13–17)
MCHC RBC-ENTMCNC: 29.3 PG — SIGNIFICANT CHANGE UP (ref 27–34)
MCHC RBC-ENTMCNC: 32.4 GM/DL — SIGNIFICANT CHANGE UP (ref 32–36)
MCV RBC AUTO: 90.4 FL — SIGNIFICANT CHANGE UP (ref 80–100)
PLATELET # BLD AUTO: 183 K/UL — SIGNIFICANT CHANGE UP (ref 150–400)
POTASSIUM SERPL-MCNC: 3.7 MMOL/L — SIGNIFICANT CHANGE UP (ref 3.5–5.3)
POTASSIUM SERPL-SCNC: 3.7 MMOL/L — SIGNIFICANT CHANGE UP (ref 3.5–5.3)
RBC # BLD: 4.27 M/UL — SIGNIFICANT CHANGE UP (ref 4.2–5.8)
RBC # FLD: 13.2 % — SIGNIFICANT CHANGE UP (ref 10.3–14.5)
SODIUM SERPL-SCNC: 136 MMOL/L — SIGNIFICANT CHANGE UP (ref 135–145)
WBC # BLD: 6.92 K/UL — SIGNIFICANT CHANGE UP (ref 3.8–10.5)
WBC # FLD AUTO: 6.92 K/UL — SIGNIFICANT CHANGE UP (ref 3.8–10.5)

## 2020-02-05 PROCEDURE — 99233 SBSQ HOSP IP/OBS HIGH 50: CPT

## 2020-02-05 PROCEDURE — 99024 POSTOP FOLLOW-UP VISIT: CPT

## 2020-02-05 RX ORDER — ACETAMINOPHEN 500 MG
650 TABLET ORAL EVERY 6 HOURS
Refills: 0 | Status: DISCONTINUED | OUTPATIENT
Start: 2020-02-05 | End: 2020-02-07

## 2020-02-05 RX ORDER — NITROGLYCERIN 6.5 MG
0.5 CAPSULE, EXTENDED RELEASE ORAL EVERY 8 HOURS
Refills: 0 | Status: DISCONTINUED | OUTPATIENT
Start: 2020-02-05 | End: 2020-02-05

## 2020-02-05 RX ORDER — OXYCODONE HYDROCHLORIDE 5 MG/1
5 TABLET ORAL EVERY 4 HOURS
Refills: 0 | Status: DISCONTINUED | OUTPATIENT
Start: 2020-02-05 | End: 2020-02-07

## 2020-02-05 RX ADMIN — OXYCODONE HYDROCHLORIDE 5 MILLIGRAM(S): 5 TABLET ORAL at 18:34

## 2020-02-05 RX ADMIN — OXYCODONE HYDROCHLORIDE 5 MILLIGRAM(S): 5 TABLET ORAL at 14:14

## 2020-02-05 RX ADMIN — Medication 1000 MILLIGRAM(S): at 03:10

## 2020-02-05 RX ADMIN — DEXTROSE MONOHYDRATE, SODIUM CHLORIDE, AND POTASSIUM CHLORIDE 100 MILLILITER(S): 50; .745; 4.5 INJECTION, SOLUTION INTRAVENOUS at 00:32

## 2020-02-05 RX ADMIN — PANTOPRAZOLE SODIUM 40 MILLIGRAM(S): 20 TABLET, DELAYED RELEASE ORAL at 11:39

## 2020-02-05 RX ADMIN — Medication 10 MILLIGRAM(S): at 09:48

## 2020-02-05 RX ADMIN — DEXTROSE MONOHYDRATE, SODIUM CHLORIDE, AND POTASSIUM CHLORIDE 100 MILLILITER(S): 50; .745; 4.5 INJECTION, SOLUTION INTRAVENOUS at 12:05

## 2020-02-05 RX ADMIN — Medication 10 MILLIGRAM(S): at 21:48

## 2020-02-05 RX ADMIN — ATORVASTATIN CALCIUM 80 MILLIGRAM(S): 80 TABLET, FILM COATED ORAL at 21:47

## 2020-02-05 RX ADMIN — ENOXAPARIN SODIUM 40 MILLIGRAM(S): 100 INJECTION SUBCUTANEOUS at 11:39

## 2020-02-05 RX ADMIN — Medication 400 MILLIGRAM(S): at 02:10

## 2020-02-05 RX ADMIN — OXYCODONE HYDROCHLORIDE 5 MILLIGRAM(S): 5 TABLET ORAL at 21:48

## 2020-02-05 RX ADMIN — OXYCODONE HYDROCHLORIDE 5 MILLIGRAM(S): 5 TABLET ORAL at 19:20

## 2020-02-05 RX ADMIN — OXYCODONE HYDROCHLORIDE 5 MILLIGRAM(S): 5 TABLET ORAL at 22:45

## 2020-02-05 RX ADMIN — FAMOTIDINE 20 MILLIGRAM(S): 10 INJECTION INTRAVENOUS at 11:39

## 2020-02-05 RX ADMIN — OXYCODONE HYDROCHLORIDE 5 MILLIGRAM(S): 5 TABLET ORAL at 15:15

## 2020-02-05 NOTE — PROGRESS NOTE ADULT - SUBJECTIVE AND OBJECTIVE BOX
HOSPITALIST PROGRESS NOTE:  SUBJECTIVE:  PCP:  Chief Complaint: Patient is a 59y old  Male who presents with a chief complaint of SBO (02 Feb 2020 13:28)      HPI:  The patient  is a 58 yo male with Hx. of HTN,HLD,  Cocaine abuse who lives in [place] and came to visit his mother when he developed vomiting x1 and LUQ pain radiating into his back.   Had EKG done which showed ST elev. in ant leads.Trop <0.015 neg, repeat EKG no change.  CTA abdo shows SBO. Had NGT placed,  had surgical consult, had Cardiology cons. Dr. Hobbs. no signs of ACS    2/3: Above reviewed. overnight patient pulled out NGT as per the staff; This morning he was on his knees and hands complaining of lower abd pain;  patient also had uncontrolled BP overnight  2/4: BP slightly better but still high; yesterday patient went to the OR; NGT is in and draining  2/5:  NGT still draining; BP still slightly high; patient has no complaints     Allergies:  Allergy Status Unknown    REVIEW OF SYSTEMS:  See HPI. All other review of systems is negative unless indicated above.     OBJECTIVE  Physical Exam:  Vital Signs Last 24 Hrs  T(C): 36.7 (05 Feb 2020 14:05), Max: 37.1 (04 Feb 2020 20:27)  T(F): 98 (05 Feb 2020 14:05), Max: 98.8 (04 Feb 2020 20:27)  HR: 85 (05 Feb 2020 14:05) (75 - 85)  BP: 174/68 (05 Feb 2020 14:05) (162/81 - 187/88)  BP(mean): --  RR: 18 (05 Feb 2020 14:05) (18 - 18)  SpO2: 97% (05 Feb 2020 14:05) (96% - 97%)    Constitutional: NAD, awake and alert, well-developed  Neurological: AAO x 3, no focal deficits  HEENT: PERRLA, EOMI, MMM  Neck: Soft and supple, No LAD, No JVD  Respiratory: Breath sounds are clear bilaterally, No wheezing, rales or rhonchi  Cardiovascular: S1 and S2, regular rate and rhythm; no Murmurs, gallops or rubs  Gastrointestinal: Bowel Sounds present, soft, nontender, nondistended, no guarding, no rebound tenderness  Back: No CVA tenderness   Extremities: No peripheral edema  Vascular: 2+ peripheral pulses  Musculoskeletal: 5/5 strength b/l upper and lower extremities  Skin: No rashes  Breast: Deferred  Rectal: Deferred    MEDICATIONS  (STANDING):  amLODIPine   Tablet 10 milliGRAM(s) Oral daily  atorvastatin 80 milliGRAM(s) Oral at bedtime  enoxaparin Injectable 40 milliGRAM(s) SubCutaneous daily  famotidine Injectable 20 milliGRAM(s) IV Push daily  lactated ringers. 1000 milliLiter(s) (100 mL/Hr) IV Continuous <Continuous>  lisinopril 10 milliGRAM(s) Oral daily  metoprolol tartrate 25 milliGRAM(s) Oral two times a day    Lab Results:  CBC  CBC Full  -  ( 05 Feb 2020 08:06 )  WBC Count : 6.92 K/uL  RBC Count : 4.27 M/uL  Hemoglobin : 12.5 g/dL  Hematocrit : 38.6 %  Platelet Count - Automated : 183 K/uL  Mean Cell Volume : 90.4 fl  Mean Cell Hemoglobin : 29.3 pg  Mean Cell Hemoglobin Concentration : 32.4 gm/dL  Auto Neutrophil # : x  Auto Lymphocyte # : x  Auto Monocyte # : x  Auto Eosinophil # : x  Auto Basophil # : x  Auto Neutrophil % : x  Auto Lymphocyte % : x  Auto Monocyte % : x  Auto Eosinophil % : x  Auto Basophil % : x    .		Differential:	[] Automated		[] Manual  Chemistry                        12.5   6.92  )-----------( 183      ( 05 Feb 2020 08:06 )             38.6     02-05    136  |  105  |  7   ----------------------------<  106<H>  3.7   |  26  |  1.01    Ca    9.1      05 Feb 2020 08:06  Phos  3.6     02-04  Mg     1.8     02-04      RADIOLOGY/EKG:      < from: Xray Chest 1 View-PORTABLE IMMEDIATE (02.03.20 @ 10:24) >    IMPRESSION: NG tube in place.    < end of copied text >    < from: CT Angio Abdomen and Pelvis w/ IV Cont (02.02.20 @ 09:15) >    IMPRESSION:     No aortic dissection in the thorax or abdomen.    What likely represents early developing small bowel obstruction secondary to an internal hernia is suspected with details as above.      < end of copied text >      < from: Transthoracic Echocardiogram (02.03.20 @ 14:37) >     Impression     Summary     The left ventricle is normal in size, wall motion and contractility.   Mild concentric left ventricular hypertrophyis present.   Estimated left ventricular ejection fraction is 60-65 %.   Normal appearing left atrium.   Normal appearing right atrium.   Normal appearing right ventricle structure and function.   Normal aortic valve structure and function.   EA reversal of the mitral inflow consistent with reduced compliance of the   left ventricle.   Trace mitral regurgitation is present.   Normal appearing tricuspid valve structure and function.   Trace pulmonic valvular regurgitation is present.   Trace pericardial effusion is present.   No evidence of pleural effusion.   All visualized extra cardiac structures appears to be normal.     Signature      < end of copied text >

## 2020-02-05 NOTE — PROGRESS NOTE ADULT - SUBJECTIVE AND OBJECTIVE BOX
Patient is a 59y old  Male who presents with a chief complaint of abdominal pain (04 Feb 2020 07:39)      HPI:  59M seen and examined in the ed who presented with complaints of epigastric pain and chest pain. Patient states he had done cocaine about 48 hours ago and his po intake in last 24 hour had been excessive. Patient states due to worsening epigastric discomfort he decided to come to the emergency room. Denies any nausea or vomiting currently but does claim to have epigastric discomfort. Denies any similar episodes in the past. No fevers or chills reported. Patient reports of having one episode of vomiting prior to coming to the hospital.   2/4: Pt seen and examined, pain moderately controlled,  no nausea, no fever, no GI function. NGT bilious.   ROS:.  [ ] Due to altered mental status/intubation, subjective information were not able to be obtained from the patient. History was obtained, to the extent possible, from review of the chart and collateral sources of information.      2/5 doing well, no acute issues, pain well tolerated.     All other system review is negative .  PAST MEDICAL & SURGICAL HISTORY:  HLD (hyperlipidemia)  HTN (hypertension)  History of back surgery    FAMILY HISTORY:    Social History:     Alcohol: Denied  Smoking: Denied  Drug Use: Denied          PHYSICAL EXAM:  Constitutional: NAD, GCS: 15/15  AOX3  Eyes:  WNL  ENMT:  WNL  Neck:  WNL, non tender  Back: Non tender  Respiratory: CTABL  Cardiovascular:  S1+S2+0  Gastrointestinal: Soft, mild distention, appropriately tender, dressing clean intact,   Genitourinary:  WNL  Extremities: NV intact  Vascular:  Intact  Neurological: No focal neurological deficit,  CN, motor and sensory system grossly intact.  Skin: WNL  Musculoskeletal: WNL  Psychiatric: Grossly WNL        Labs:                                   13.0   11.96 )-----------( 205      ( 04 Feb 2020 08:12 )             39.6     02-04    137  |  106  |  9   ----------------------------<  100<H>  4.0   |  27  |  0.92    Ca    8.9      04 Feb 2020 08:12  Phos  3.6     02-04  Mg     1.8     02-04    TPro  7.2  /  Alb  3.4  /  TBili  0.6  /  DBili  x   /  AST  24  /  ALT  29  /  AlkPhos  75  02-03

## 2020-02-05 NOTE — PROGRESS NOTE ADULT - PROBLEM SELECTOR PLAN 1
Pain controlled  C/W IV hydration   C/W IV antibiotics   NGT removed, advance to clear liquid diet  OOB-->chair and encourage ambulation   Encourage incentive spirometry Pain controlled  C/W IV hydration   C/W IV antibiotics   Abd dressing removed, monitor site   NGT removed, advance to clear liquid diet  OOB-->chair and encourage ambulation   Encourage incentive spirometry

## 2020-02-05 NOTE — PROGRESS NOTE ADULT - ASSESSMENT
#Abdominal Pain 2ndry to small bowel obstruction/Obstructed Internal Hernia Repair S/P Internal hernia repair and Lysis of peritoneal adhesions 2/3  #Lactic Acidosis most likely 2ndry to above, Now resolved   - Management as per surgery  - continue ngt   - Pain Management  - serial abdominal exams  - IVF  - Diet as per surgery    #Abnormal EKG with Cocaine Use  -Recommend Transfer to Med surg, no alarms on tele   -Troponin X 2 neg  -Cardio consult appreciated   -ECHO  Estimated left ventricular ejection fraction is 60-65 %.  -Avoid BB due to hx of cocaine use     #Uncontrolled Hypertension most likley 2ndry to noncompliance, Acute Pain and Cocaine use   -restart Norvasc 10mg and Lisinopril, if still high then add hydralazine 25mg PO Q8 if needed   -S/P IV enalaprilat   -continue IV hydralazine PRN    -cardio consult appreciated   -Avoid BB due to Hx of cocaine use     #DVT ppx

## 2020-02-05 NOTE — PROGRESS NOTE ADULT - SUBJECTIVE AND OBJECTIVE BOX
Patient is a 59y old  Male who presents with a chief complaint of abdominal pain (04 Feb 2020 07:39)      HPI:  59M seen and examined in the ed who presented with complaints of epigastric pain and chest pain. Patient states he had done cocaine about 48 hours ago and his po intake in last 24 hour had been excessive. Patient states due to worsening epigastric discomfort he decided to come to the emergency room. Denies any nausea or vomiting currently but does claim to have epigastric discomfort. Denies any similar episodes in the past. No fevers or chills reported. Patient reports of having one episode of vomiting prior to coming to the hospital.   2/4: Pt seen and examined, pain moderately controlled,  no nausea, no fever, no GI function. NGT bilious.   2/5: Pt seen and examined at bedside with attending, Dr Rodriguez, no acute issues, doing well, NGT removed. No c/o nausea or vomiting and pain has decreased.     ROS:. all system review negative except for noted above in HPI     Vital Signs Last 24 Hrs  T(C): 36.7 (05 Feb 2020 14:05), Max: 37.1 (04 Feb 2020 20:27)  T(F): 98 (05 Feb 2020 14:05), Max: 98.8 (04 Feb 2020 20:27)  HR: 85 (05 Feb 2020 14:05) (75 - 85)  BP: 174/68 (05 Feb 2020 14:05) (162/81 - 187/88)  RR: 18 (05 Feb 2020 14:05) (18 - 18)  SpO2: 97% (05 Feb 2020 14:05) (96% - 97%)  	    PAST MEDICAL & SURGICAL HISTORY:  HLD (hyperlipidemia)  HTN (hypertension)  History of back surgery    FAMILY HISTORY:    Social History:     Alcohol: Denied  Smoking: Denied  Drug Use: Last cocaine use 48 hrs PTA in ED       PHYSICAL EXAM:  Constitutional: well-appearing, well-developed male, no acute distress, AAOx4   Eyes:  WNL  ENMT:  WNL  Respiratory: resps even, unlabored in no resp distress   Gastrointestinal: Soft, mild distention, appropriately tender, dressing removed, site clean, intact, no drainage   Genitourinary:  WNL  Extremities: NV intact  Vascular:  Intact  Neurological: No focal neurological deficit,  CN, motor and sensory system grossly intact.  Skin: WNL  Musculoskeletal: WNL  Psychiatric: Grossly WNL        Labs:                          12.5   6.92  )-----------( 183      ( 05 Feb 2020 08:06 )             38.6     02-05    136  |  105  |  7   ----------------------------<  106<H>  3.7   |  26  |  1.01    Ca    9.1      05 Feb 2020 08:06  Phos  3.6     02-04  Mg     1.8     02-04

## 2020-02-05 NOTE — PROGRESS NOTE ADULT - ASSESSMENT
59 y old marti S/P ex lap, ALEXIS, for SBO, internal hernia  POD 2  Pain control, DC PCA  IV hydration  IV antibiotics  DVT/GI prophylaxis  NGT  monitor bowel function, advance diet as tolerated  Medical service following for BP control on IV hydralazine, enalapril   Replace electrolyte  PT  OOB, ambulate  incentive spirometry  D/W family, Nursing staff.

## 2020-02-06 ENCOUNTER — TRANSCRIPTION ENCOUNTER (OUTPATIENT)
Age: 60
End: 2020-02-06

## 2020-02-06 PROCEDURE — 99024 POSTOP FOLLOW-UP VISIT: CPT

## 2020-02-06 PROCEDURE — 99233 SBSQ HOSP IP/OBS HIGH 50: CPT

## 2020-02-06 RX ORDER — ATORVASTATIN CALCIUM 80 MG/1
1 TABLET, FILM COATED ORAL
Qty: 30 | Refills: 0
Start: 2020-02-06 | End: 2020-03-06

## 2020-02-06 RX ORDER — HYDRALAZINE HCL 50 MG
25 TABLET ORAL EVERY 8 HOURS
Refills: 0 | Status: DISCONTINUED | OUTPATIENT
Start: 2020-02-06 | End: 2020-02-06

## 2020-02-06 RX ORDER — AMLODIPINE BESYLATE 2.5 MG/1
1 TABLET ORAL
Qty: 0 | Refills: 0 | DISCHARGE
Start: 2020-02-06

## 2020-02-06 RX ORDER — AMLODIPINE BESYLATE 2.5 MG/1
1 TABLET ORAL
Qty: 14 | Refills: 0
Start: 2020-02-06 | End: 2020-02-19

## 2020-02-06 RX ORDER — ATORVASTATIN CALCIUM 80 MG/1
1 TABLET, FILM COATED ORAL
Qty: 0 | Refills: 0 | DISCHARGE
Start: 2020-02-06

## 2020-02-06 RX ORDER — LISINOPRIL 2.5 MG/1
40 TABLET ORAL DAILY
Refills: 0 | Status: DISCONTINUED | OUTPATIENT
Start: 2020-02-07 | End: 2020-02-07

## 2020-02-06 RX ORDER — HYDRALAZINE HCL 50 MG
25 TABLET ORAL EVERY 6 HOURS
Refills: 0 | Status: DISCONTINUED | OUTPATIENT
Start: 2020-02-06 | End: 2020-02-06

## 2020-02-06 RX ORDER — ACETAMINOPHEN 500 MG
2 TABLET ORAL
Qty: 0 | Refills: 0 | DISCHARGE
Start: 2020-02-06

## 2020-02-06 RX ORDER — HYDRALAZINE HCL 50 MG
25 TABLET ORAL EVERY 6 HOURS
Refills: 0 | Status: DISCONTINUED | OUTPATIENT
Start: 2020-02-06 | End: 2020-02-07

## 2020-02-06 RX ORDER — OXYCODONE HYDROCHLORIDE 5 MG/1
1 TABLET ORAL
Qty: 16 | Refills: 0
Start: 2020-02-06 | End: 2020-02-09

## 2020-02-06 RX ORDER — LISINOPRIL 2.5 MG/1
1 TABLET ORAL
Qty: 30 | Refills: 0
Start: 2020-02-06

## 2020-02-06 RX ORDER — LISINOPRIL 2.5 MG/1
20 TABLET ORAL ONCE
Refills: 0 | Status: COMPLETED | OUTPATIENT
Start: 2020-02-06 | End: 2020-02-06

## 2020-02-06 RX ADMIN — ATORVASTATIN CALCIUM 80 MILLIGRAM(S): 80 TABLET, FILM COATED ORAL at 20:54

## 2020-02-06 RX ADMIN — Medication 1 PATCH: at 17:56

## 2020-02-06 RX ADMIN — OXYCODONE HYDROCHLORIDE 5 MILLIGRAM(S): 5 TABLET ORAL at 15:35

## 2020-02-06 RX ADMIN — OXYCODONE HYDROCHLORIDE 5 MILLIGRAM(S): 5 TABLET ORAL at 04:26

## 2020-02-06 RX ADMIN — Medication 25 MILLIGRAM(S): at 09:53

## 2020-02-06 RX ADMIN — DEXTROSE MONOHYDRATE, SODIUM CHLORIDE, AND POTASSIUM CHLORIDE 100 MILLILITER(S): 50; .745; 4.5 INJECTION, SOLUTION INTRAVENOUS at 00:43

## 2020-02-06 RX ADMIN — OXYCODONE HYDROCHLORIDE 5 MILLIGRAM(S): 5 TABLET ORAL at 09:21

## 2020-02-06 RX ADMIN — Medication 1 PATCH: at 18:32

## 2020-02-06 RX ADMIN — OXYCODONE HYDROCHLORIDE 5 MILLIGRAM(S): 5 TABLET ORAL at 08:39

## 2020-02-06 RX ADMIN — LISINOPRIL 20 MILLIGRAM(S): 2.5 TABLET ORAL at 06:24

## 2020-02-06 RX ADMIN — Medication 25 MILLIGRAM(S): at 14:32

## 2020-02-06 RX ADMIN — AMLODIPINE BESYLATE 10 MILLIGRAM(S): 2.5 TABLET ORAL at 06:24

## 2020-02-06 RX ADMIN — OXYCODONE HYDROCHLORIDE 5 MILLIGRAM(S): 5 TABLET ORAL at 18:31

## 2020-02-06 RX ADMIN — FAMOTIDINE 20 MILLIGRAM(S): 10 INJECTION INTRAVENOUS at 12:44

## 2020-02-06 RX ADMIN — OXYCODONE HYDROCHLORIDE 5 MILLIGRAM(S): 5 TABLET ORAL at 23:17

## 2020-02-06 RX ADMIN — ENOXAPARIN SODIUM 40 MILLIGRAM(S): 100 INJECTION SUBCUTANEOUS at 12:44

## 2020-02-06 RX ADMIN — PANTOPRAZOLE SODIUM 40 MILLIGRAM(S): 20 TABLET, DELAYED RELEASE ORAL at 12:44

## 2020-02-06 RX ADMIN — OXYCODONE HYDROCHLORIDE 5 MILLIGRAM(S): 5 TABLET ORAL at 22:47

## 2020-02-06 RX ADMIN — OXYCODONE HYDROCHLORIDE 5 MILLIGRAM(S): 5 TABLET ORAL at 03:13

## 2020-02-06 RX ADMIN — Medication 25 MILLIGRAM(S): at 20:54

## 2020-02-06 RX ADMIN — OXYCODONE HYDROCHLORIDE 5 MILLIGRAM(S): 5 TABLET ORAL at 14:33

## 2020-02-06 RX ADMIN — LISINOPRIL 20 MILLIGRAM(S): 2.5 TABLET ORAL at 12:43

## 2020-02-06 NOTE — DISCHARGE NOTE PROVIDER - NSDCFUADDINST_GEN_ALL_CORE_FT
Please seek immediate medical attention for worsening abdominal pain, inability to pass flatus, fever>100.4, chest pain, shortness of breath, any adverse changes to health

## 2020-02-06 NOTE — DISCHARGE NOTE PROVIDER - NSDCMRMEDTOKEN_GEN_ALL_CORE_FT
acetaminophen 325 mg oral tablet: 2 tab(s) orally every 6 hours, As needed, Temp greater or equal to 38C (100.4F), Mild Pain (1 - 3), Moderate Pain (4 - 6)  amLODIPine 10 mg oral tablet: 1 tab(s) orally once a day  atorvastatin 80 mg oral tablet: 1 tab(s) orally once a day (at bedtime)  lisinopril 20 mg oral tablet: 1 tab(s) orally once a day  oxyCODONE 5 mg oral tablet: 1 tab(s) orally every 6 hours, As Needed -for moderate pain MDD:4 tabs acetaminophen 325 mg oral tablet: 2 tab(s) orally every 6 hours, As needed, Temp greater or equal to 38C (100.4F), Mild Pain (1 - 3), Moderate Pain (4 - 6)  amLODIPine 10 mg oral tablet: 1 tab(s) orally once a day  atorvastatin 80 mg oral tablet: 1 tab(s) orally once a day (at bedtime)  cloNIDine 0.1 mg/24 hr transdermal film, extended release: 1 patch transdermal once a week   hydrALAZINE 25 mg oral tablet: 1 tab(s) orally every 6 hours  lisinopril 40 mg oral tablet: 1 tab(s) orally once a day  oxyCODONE 5 mg oral tablet: 1 tab(s) orally every 6 hours, As Needed -for moderate pain MDD:4 tabs

## 2020-02-06 NOTE — PROGRESS NOTE ADULT - ASSESSMENT
#Abdominal Pain 2ndry to small bowel obstruction/Obstructed Internal Hernia Repair S/P Internal hernia repair and Lysis of peritoneal adhesions 2/3  #Lactic Acidosis most likely 2ndry to above, Now resolved   - Management as per surgery  - continue ngt   - Pain Management  - serial abdominal exams  - S/P IVF  - Diet as per surgery    #Abnormal EKG with Cocaine Use  -Recommend Transfer to Med surg, no alarms on tele   -Troponin X 2 neg  -Cardio consult appreciated   -ECHO  Estimated left ventricular ejection fraction is 60-65 %.  -Avoid BB due to hx of cocaine use     #Uncontrolled Hypertension most likley 2ndry to noncompliance, Acute Pain and Cocaine use   -restart Norvasc 10mg, Increase Lisinopril 40mg QD, add hydralazine 25mg PO Q6H and add Clonidine Patch .1 Qweekly  -S/P IV enalaprilat   -S/P IV hydralazine PRN    -cardio consult appreciated   -Avoid BB due to Hx of cocaine use     #DVT ppx

## 2020-02-06 NOTE — PROGRESS NOTE ADULT - SUBJECTIVE AND OBJECTIVE BOX
HOSPITALIST PROGRESS NOTE:  SUBJECTIVE:  PCP:  Chief Complaint: Patient is a 59y old  Male who presents with a chief complaint of SBO (02 Feb 2020 13:28)      HPI:  The patient  is a 58 yo male with Hx. of HTN,HLD,  Cocaine abuse who lives in [place] and came to visit his mother when he developed vomiting x1 and LUQ pain radiating into his back.   Had EKG done which showed ST elev. in ant leads.Trop <0.015 neg, repeat EKG no change.  CTA abdo shows SBO. Had NGT placed,  had surgical consult, had Cardiology cons. Dr. Hobbs. no signs of ACS    2/3: Above reviewed. overnight patient pulled out NGT as per the staff; This morning he was on his knees and hands complaining of lower abd pain;  patient also had uncontrolled BP overnight  2/4: BP slightly better but still high; yesterday patient went to the OR; NGT is in and draining  2/5:  NGT still draining; BP still slightly high; patient has no complaints   2/6: Patient was cleared to be discharged by surgery but i asked that he stay due to uncontrollled BP despite starting him back on PO meds     Allergies:  Allergy Status Unknown    REVIEW OF SYSTEMS:  See HPI. All other review of systems is negative unless indicated above.     OBJECTIVE  Physical Exam:  Vital Signs Last 24 Hrs  T(C): 36.7 (06 Feb 2020 14:26), Max: 37.2 (05 Feb 2020 21:13)  T(F): 98.1 (06 Feb 2020 14:26), Max: 99 (05 Feb 2020 21:13)  HR: 84 (06 Feb 2020 14:26) (74 - 86)  BP: 184/82 (06 Feb 2020 14:26) (170/70 - 184/82)  BP(mean): --  RR: 18 (06 Feb 2020 14:26) (18 - 18)  SpO2: 100% (06 Feb 2020 14:26) (97% - 100%)    Constitutional: NAD, awake and alert, well-developed  Neurological: AAO x 3, no focal deficits  HEENT: PERRLA, EOMI, MMM  Neck: Soft and supple, No LAD, No JVD  Respiratory: Breath sounds are clear bilaterally, No wheezing, rales or rhonchi  Cardiovascular: S1 and S2, regular rate and rhythm; no Murmurs, gallops or rubs  Gastrointestinal: Bowel Sounds present, soft, nontender, nondistended, no guarding, no rebound tenderness  Back: No CVA tenderness   Extremities: No peripheral edema  Vascular: 2+ peripheral pulses  Musculoskeletal: 5/5 strength b/l upper and lower extremities  Skin: No rashes  Breast: Deferred  Rectal: Deferred    MEDICATIONS  (STANDING):  amLODIPine   Tablet 10 milliGRAM(s) Oral daily  atorvastatin 80 milliGRAM(s) Oral at bedtime  enoxaparin Injectable 40 milliGRAM(s) SubCutaneous daily  famotidine Injectable 20 milliGRAM(s) IV Push daily  lactated ringers. 1000 milliLiter(s) (100 mL/Hr) IV Continuous <Continuous>  lisinopril 10 milliGRAM(s) Oral daily  metoprolol tartrate 25 milliGRAM(s) Oral two times a day    Lab Results:  CBC  CBC Full  -  ( 05 Feb 2020 08:06 )  WBC Count : 6.92 K/uL  RBC Count : 4.27 M/uL  Hemoglobin : 12.5 g/dL  Hematocrit : 38.6 %  Platelet Count - Automated : 183 K/uL  Mean Cell Volume : 90.4 fl  Mean Cell Hemoglobin : 29.3 pg  Mean Cell Hemoglobin Concentration : 32.4 gm/dL  Auto Neutrophil # : x  Auto Lymphocyte # : x  Auto Monocyte # : x  Auto Eosinophil # : x  Auto Basophil # : x  Auto Neutrophil % : x  Auto Lymphocyte % : x  Auto Monocyte % : x  Auto Eosinophil % : x  Auto Basophil % : x    .		Differential:	[] Automated		[] Manual  Chemistry                        12.5   6.92  )-----------( 183      ( 05 Feb 2020 08:06 )             38.6     02-05    136  |  105  |  7   ----------------------------<  106<H>  3.7   |  26  |  1.01    Ca    9.1      05 Feb 2020 08:06      RADIOLOGY/EKG:      < from: Xray Chest 1 View-PORTABLE IMMEDIATE (02.03.20 @ 10:24) >    IMPRESSION: NG tube in place.    < end of copied text >    < from: CT Angio Abdomen and Pelvis w/ IV Cont (02.02.20 @ 09:15) >    IMPRESSION:     No aortic dissection in the thorax or abdomen.    What likely represents early developing small bowel obstruction secondary to an internal hernia is suspected with details as above.      < end of copied text >      < from: Transthoracic Echocardiogram (02.03.20 @ 14:37) >     Impression     Summary     The left ventricle is normal in size, wall motion and contractility.   Mild concentric left ventricular hypertrophyis present.   Estimated left ventricular ejection fraction is 60-65 %.   Normal appearing left atrium.   Normal appearing right atrium.   Normal appearing right ventricle structure and function.   Normal aortic valve structure and function.   EA reversal of the mitral inflow consistent with reduced compliance of the   left ventricle.   Trace mitral regurgitation is present.   Normal appearing tricuspid valve structure and function.   Trace pulmonic valvular regurgitation is present.   Trace pericardial effusion is present.   No evidence of pleural effusion.   All visualized extra cardiac structures appears to be normal.     Signature      < end of copied text >

## 2020-02-06 NOTE — PROGRESS NOTE ADULT - SUBJECTIVE AND OBJECTIVE BOX
CC:Patient is a 59y old  Male who presents with a chief complaint of abdominal pain (05 Feb 2020 15:55)      Subjective:  Pt seen and examined at bedside with chaperone. Pt is AAOx3, pt in no acute distress. Pt states tolerated abd incisonal pain with meds. Pt denied c/o fever, chills, chest pain, SOB, N/V/D, extremity pain or dysfunction, hemoptysis, hematemesis, hematuria, hematochexia, headache, diplopia, vertigo, dizzyness. Pt tolerating diet, (+) void, (+) ambulation, (+) bowel function of flatus and bm    ROS:  otherwise as abovementioned ROS    Vital Signs Last 24 Hrs  T(C): 36.8 (06 Feb 2020 11:12), Max: 37.2 (05 Feb 2020 21:13)  T(F): 98.3 (06 Feb 2020 11:12), Max: 99 (05 Feb 2020 21:13)  HR: 86 (06 Feb 2020 11:12) (74 - 86)  BP: 179/90 (06 Feb 2020 11:12) (170/70 - 179/90)  BP(mean): --  RR: 18 (06 Feb 2020 11:12) (18 - 18)  SpO2: 100% (06 Feb 2020 11:12) (97% - 100%)    Labs:                                12.5   6.92  )-----------( 183      ( 05 Feb 2020 08:06 )             38.6     CBC Full  -  ( 05 Feb 2020 08:06 )  WBC Count : 6.92 K/uL  RBC Count : 4.27 M/uL  Hemoglobin : 12.5 g/dL  Hematocrit : 38.6 %  Platelet Count - Automated : 183 K/uL  Mean Cell Volume : 90.4 fl  Mean Cell Hemoglobin : 29.3 pg  Mean Cell Hemoglobin Concentration : 32.4 gm/dL  Auto Neutrophil # : x  Auto Lymphocyte # : x  Auto Monocyte # : x  Auto Eosinophil # : x  Auto Basophil # : x  Auto Neutrophil % : x  Auto Lymphocyte % : x  Auto Monocyte % : x  Auto Eosinophil % : x  Auto Basophil % : x    02-05    136  |  105  |  7   ----------------------------<  106<H>  3.7   |  26  |  1.01    Ca    9.1      05 Feb 2020 08:06              Meds:  acetaminophen   Tablet .. 650 milliGRAM(s) Oral every 6 hours PRN  amLODIPine   Tablet 10 milliGRAM(s) Oral daily  atorvastatin 80 milliGRAM(s) Oral at bedtime  dextrose 5% + sodium chloride 0.45% with potassium chloride 20 mEq/L 1000 milliLiter(s) IV Continuous <Continuous>  enoxaparin Injectable 40 milliGRAM(s) SubCutaneous daily  famotidine Injectable 20 milliGRAM(s) IV Push daily  hydrALAZINE 25 milliGRAM(s) Oral every 8 hours  hydrALAZINE Injectable 10 milliGRAM(s) IV Push every 6 hours PRN  ketorolac   Injectable 15 milliGRAM(s) IV Push every 6 hours PRN  ondansetron Injectable 4 milliGRAM(s) IV Push every 8 hours PRN  oxyCODONE    IR 5 milliGRAM(s) Oral every 4 hours  pantoprazole  Injectable 40 milliGRAM(s) IV Push daily      Radiology:      Physical exam:  Pt is aaox3  Pt in no acute distress  Psych: normal affect  Resp: CTAB  CVS: S1S2(+)  ABD: bowel sounds (+), soft, non distended, no rebound, no guarding, no rigidity, no skin changes to exam. Incision site is clean, dry, intact, no cellulitis, no d/c, no purulence, no fluctuance. (+) appropriate incisional tenderness to exam  EXT: no calf tenderness or edema to exam b/l, on VTE prophylaxis  Skin: no adverse skin changes to exam

## 2020-02-06 NOTE — DISCHARGE NOTE PROVIDER - NSDCCPCAREPLAN_GEN_ALL_CORE_FT
PRINCIPAL DISCHARGE DIAGNOSIS  Diagnosis: Small bowel obstruction  Assessment and Plan of Treatment:       SECONDARY DISCHARGE DIAGNOSES  Diagnosis: Cocaine abuse  Assessment and Plan of Treatment:     Diagnosis: Abnormal EKG  Assessment and Plan of Treatment: PRINCIPAL DISCHARGE DIAGNOSIS  Diagnosis: Small bowel obstruction  Assessment and Plan of Treatment:       SECONDARY DISCHARGE DIAGNOSES  Diagnosis: Hypertension  Assessment and Plan of Treatment: continue Lisino[pril 40mg PO QD, norvasc 10mg PO QD, Hydralazine 25mg PO Q6H and Clonidine Patch .1 PO Qweekly might need to be titrated as outpatient; patient is advised to FU with PCP next week for BP check before going to [place]      Diagnosis: Cocaine abuse  Assessment and Plan of Treatment: refrain from use as it can effect you heart and you bp    Diagnosis: Abnormal EKG  Assessment and Plan of Treatment:

## 2020-02-06 NOTE — DISCHARGE NOTE PROVIDER - CARE PROVIDERS DIRECT ADDRESSES
,[email or web address],DirectAddress_Unknown,DirectAddress_Unknown

## 2020-02-06 NOTE — DISCHARGE NOTE PROVIDER - CARE PROVIDER_API CALL
Danya Vaz (DO)  Surgery  [address], [address] [place] [address]  Phone: [phone]  Fax: [phone]  Established Patient  Follow Up Time: 2 weeks Danya Vaz (DO)  Surgery  [address], [address]nd [place] [address]  Phone: [phone]  Fax: [phone]  Established Patient  Follow Up Time: 2 weeks    Damien Valentin)  Cardiovascular Disease; Internal Medicine; Nuclear Cardiology  [address]  [place] [address]  Phone: [phone]  Fax: [phone]  Follow Up Time: 1 week    Primary medical Doctor,   Phone: (   )    -  Fax: (   )    -  Follow Up Time: 1-3 days

## 2020-02-06 NOTE — PROGRESS NOTE ADULT - ASSESSMENT
A/P:  S/P ex-lap, ALEXIS reduction of internal hernia for sbo  H/O cocaine abuse  Diet advancement as tolerated  Monitor bowel fuction  Hospitalist on consult for medical management, BP sontrol  Cardiology on consult  GI/DVT prophylaxis  Pain control  Incentive spirometry  Serial abd exams  Cont current care and meds  Pt aware of and agrees with all of the above

## 2020-02-06 NOTE — DISCHARGE NOTE PROVIDER - PROVIDER TOKENS
PROVIDER:[TOKEN:[[id number]],FOLLOWUP:[2 weeks],ESTABLISHEDPATIENT:[T]] PROVIDER:[TOKEN:[[id number]],FOLLOWUP:[2 weeks],ESTABLISHEDPATIENT:[T]],PROVIDER:[TOKEN:[[id number]],FOLLOWUP:[1 week]],FREE:[LAST:[Primary medical Doctor],PHONE:[(   )    -],FAX:[(   )    -],FOLLOWUP:[1-3 days]]

## 2020-02-06 NOTE — DISCHARGE NOTE PROVIDER - HOSPITAL COURSE
Patient presents to ED with abdominal pain, found to have a small bowel obstruction. NG tube was placed initially with no  improvement. Patient was then take to the operating room and underwent exploratory laparotomy, lysis of adhesion and omentectomy.  Patient has been doing well, ambulating, passing flatus , tolerating diet. Patient remains hemodynamically stable, ready for discharge. Patient presents to ED with abdominal pain, found to have a small bowel obstruction. NG tube was placed initially with no  improvement. Patient was then take to the operating room and underwent exploratory laparotomy, lysis of adhesion and omentectomy for internal hernia and sbo.  Patient has been doing well, ambulating, passing flatus , tolerating diet. Patient remains hemodynamically stable, ready for discharge.    Pt under care of surgery, medical hospitalist, and cardiology during hospitalization Patient presents to ED with abdominal pain, found to have a small bowel obstruction. NG tube was placed initially with no  improvement. Patient was then take to the operating room and underwent exploratory laparotomy, lysis of adhesion and omentectomy for internal hernia and sbo.  Patient has been doing well, ambulating, passing flatus , tolerating diet. Patient remains hemodynamically stable, ready for discharge.    Pt under care of surgery, medical hospitalist, and cardiology during hospitalization        patient had uncontrolled BP which has to be monitored as outpatient

## 2020-02-06 NOTE — DISCHARGE NOTE PROVIDER - NSDCACTIVITY_GEN_ALL_CORE
Stairs allowed/Showering allowed/No heavy lifting/straining Walking - Indoors allowed/Stairs allowed/Showering allowed/Do not make important decisions/No heavy lifting/straining/Do not drive or operate machinery/Walking - Outdoors allowed

## 2020-02-07 ENCOUNTER — TRANSCRIPTION ENCOUNTER (OUTPATIENT)
Age: 60
End: 2020-02-07

## 2020-02-07 VITALS
OXYGEN SATURATION: 95 % | SYSTOLIC BLOOD PRESSURE: 154 MMHG | DIASTOLIC BLOOD PRESSURE: 78 MMHG | TEMPERATURE: 99 F | HEART RATE: 79 BPM | RESPIRATION RATE: 18 BRPM

## 2020-02-07 PROCEDURE — 99024 POSTOP FOLLOW-UP VISIT: CPT

## 2020-02-07 PROCEDURE — 99232 SBSQ HOSP IP/OBS MODERATE 35: CPT

## 2020-02-07 RX ORDER — LISINOPRIL 2.5 MG/1
1 TABLET ORAL
Qty: 30 | Refills: 0
Start: 2020-02-07 | End: 2020-03-07

## 2020-02-07 RX ORDER — HYDRALAZINE HCL 50 MG
1 TABLET ORAL
Qty: 120 | Refills: 0
Start: 2020-02-07 | End: 2020-03-07

## 2020-02-07 RX ADMIN — Medication 1 PATCH: at 05:44

## 2020-02-07 RX ADMIN — AMLODIPINE BESYLATE 10 MILLIGRAM(S): 2.5 TABLET ORAL at 05:42

## 2020-02-07 RX ADMIN — Medication 25 MILLIGRAM(S): at 10:58

## 2020-02-07 RX ADMIN — LISINOPRIL 40 MILLIGRAM(S): 2.5 TABLET ORAL at 05:42

## 2020-02-07 RX ADMIN — OXYCODONE HYDROCHLORIDE 5 MILLIGRAM(S): 5 TABLET ORAL at 10:56

## 2020-02-07 RX ADMIN — Medication 25 MILLIGRAM(S): at 05:42

## 2020-02-07 RX ADMIN — ENOXAPARIN SODIUM 40 MILLIGRAM(S): 100 INJECTION SUBCUTANEOUS at 10:57

## 2020-02-07 RX ADMIN — Medication 1 PATCH: at 10:59

## 2020-02-07 RX ADMIN — FAMOTIDINE 20 MILLIGRAM(S): 10 INJECTION INTRAVENOUS at 10:59

## 2020-02-07 RX ADMIN — Medication 25 MILLIGRAM(S): at 00:26

## 2020-02-07 RX ADMIN — PANTOPRAZOLE SODIUM 40 MILLIGRAM(S): 20 TABLET, DELAYED RELEASE ORAL at 10:58

## 2020-02-07 NOTE — PROGRESS NOTE ADULT - REASON FOR ADMISSION
abdominal pain

## 2020-02-07 NOTE — PROGRESS NOTE ADULT - SUBJECTIVE AND OBJECTIVE BOX
HOSPITALIST PROGRESS NOTE:  SUBJECTIVE:  PCP:  Chief Complaint: Patient is a 59y old  Male who presents with a chief complaint of SBO (02 Feb 2020 13:28)      HPI:  The patient  is a 58 yo male with Hx. of HTN,HLD,  Cocaine abuse who lives in [place] and came to visit his mother when he developed vomiting x1 and LUQ pain radiating into his back.   Had EKG done which showed ST elev. in ant leads.Trop <0.015 neg, repeat EKG no change.  CTA abdo shows SBO. Had NGT placed,  had surgical consult, had Cardiology cons. Dr. Hobbs. no signs of ACS    2/3: Above reviewed. overnight patient pulled out NGT as per the staff; This morning he was on his knees and hands complaining of lower abd pain;  patient also had uncontrolled BP overnight  2/4: BP slightly better but still high; yesterday patient went to the OR; NGT is in and draining  2/5:  NGT still draining; BP still slightly high; patient has no complaints   2/6: Patient was cleared to be discharged by surgery but i asked that he stay due to uncontrollled BP despite starting him back on PO meds   2/7:  bp better; + flatus no abd pain    Allergies:  Allergy Status Unknown    REVIEW OF SYSTEMS:  See HPI. All other review of systems is negative unless indicated above.     OBJECTIVE  Physical Exam:  Vital Signs Last 24 Hrs  T(C): 36.7 (07 Feb 2020 05:29), Max: 37.2 (06 Feb 2020 20:49)  T(F): 98.1 (07 Feb 2020 05:29), Max: 98.9 (06 Feb 2020 20:49)  HR: 63 (07 Feb 2020 05:29) (63 - 98)  BP: 150/61 (07 Feb 2020 05:29) (150/61 - 184/82)  BP(mean): --  RR: 18 (07 Feb 2020 05:29) (18 - 18)  SpO2: 94% (07 Feb 2020 05:29) (94% - 100%)    Constitutional: NAD, awake and alert, well-developed  Neurological: AAO x 3, no focal deficits  HEENT: PERRLA, EOMI, MMM  Neck: Soft and supple, No LAD, No JVD  Respiratory: Breath sounds are clear bilaterally, No wheezing, rales or rhonchi  Cardiovascular: S1 and S2, regular rate and rhythm; no Murmurs, gallops or rubs  Gastrointestinal: Bowel Sounds present, soft, nontender, nondistended, no guarding, no rebound tenderness  Back: No CVA tenderness   Extremities: No peripheral edema  Vascular: 2+ peripheral pulses  Musculoskeletal: 5/5 strength b/l upper and lower extremities  Skin: No rashes  Breast: Deferred  Rectal: Deferred    MEDICATIONS  (STANDING):  amLODIPine   Tablet 10 milliGRAM(s) Oral daily  atorvastatin 80 milliGRAM(s) Oral at bedtime  enoxaparin Injectable 40 milliGRAM(s) SubCutaneous daily  famotidine Injectable 20 milliGRAM(s) IV Push daily  lactated ringers. 1000 milliLiter(s) (100 mL/Hr) IV Continuous <Continuous>  lisinopril 10 milliGRAM(s) Oral daily  metoprolol tartrate 25 milliGRAM(s) Oral two times a day    Lab Results:  CBC  CBC Full  -  ( 05 Feb 2020 08:06 )  WBC Count : 6.92 K/uL  RBC Count : 4.27 M/uL  Hemoglobin : 12.5 g/dL  Hematocrit : 38.6 %  Platelet Count - Automated : 183 K/uL  Mean Cell Volume : 90.4 fl  Mean Cell Hemoglobin : 29.3 pg  Mean Cell Hemoglobin Concentration : 32.4 gm/dL  Auto Neutrophil # : x  Auto Lymphocyte # : x  Auto Monocyte # : x  Auto Eosinophil # : x  Auto Basophil # : x  Auto Neutrophil % : x  Auto Lymphocyte % : x  Auto Monocyte % : x  Auto Eosinophil % : x  Auto Basophil % : x    .		Differential:	[] Automated		[] Manual  Chemistry                        12.5   6.92  )-----------( 183      ( 05 Feb 2020 08:06 )             38.6     02-05    136  |  105  |  7   ----------------------------<  106<H>  3.7   |  26  |  1.01    Ca    9.1      05 Feb 2020 08:06      RADIOLOGY/EKG:      < from: Xray Chest 1 View-PORTABLE IMMEDIATE (02.03.20 @ 10:24) >    IMPRESSION: NG tube in place.    < end of copied text >    < from: CT Angio Abdomen and Pelvis w/ IV Cont (02.02.20 @ 09:15) >    IMPRESSION:     No aortic dissection in the thorax or abdomen.    What likely represents early developing small bowel obstruction secondary to an internal hernia is suspected with details as above.      < end of copied text >      < from: Transthoracic Echocardiogram (02.03.20 @ 14:37) >     Impression     Summary     The left ventricle is normal in size, wall motion and contractility.   Mild concentric left ventricular hypertrophyis present.   Estimated left ventricular ejection fraction is 60-65 %.   Normal appearing left atrium.   Normal appearing right atrium.   Normal appearing right ventricle structure and function.   Normal aortic valve structure and function.   EA reversal of the mitral inflow consistent with reduced compliance of the   left ventricle.   Trace mitral regurgitation is present.   Normal appearing tricuspid valve structure and function.   Trace pulmonic valvular regurgitation is present.   Trace pericardial effusion is present.   No evidence of pleural effusion.   All visualized extra cardiac structures appears to be normal.     Signature      < end of copied text >

## 2020-02-07 NOTE — PROGRESS NOTE ADULT - ASSESSMENT
#Abdominal Pain 2ndry to small bowel obstruction/Obstructed Internal Hernia Repair S/P Internal hernia repair and Lysis of peritoneal adhesions 2/3  #Lactic Acidosis most likely 2ndry to above, Now resolved   - Management as per surgery  - continue ngt   - Pain Management  - serial abdominal exams  - S/P IVF  - Diet as per surgery    #Abnormal EKG with Cocaine Use  -Recommend Transfer to Med surg, no alarms on tele   -Troponin X 2 neg  -Cardio consult appreciated   -ECHO  Estimated left ventricular ejection fraction is 60-65 %.  -Avoid BB due to hx of cocaine use     #Uncontrolled Hypertension most likley 2ndry to noncompliance, Acute Pain and Cocaine use   -restart Norvasc 10mg, Increase Lisinopril 40mg QD, add hydralazine 25mg PO Q6H and  Clonidine Patch .1 Qweekly  -S/P IV enalaprilat   -S/P IV hydralazine PRN    -cardio consult appreciated   -Avoid BB due to Hx of cocaine use     #DVT ppx

## 2020-02-07 NOTE — PROGRESS NOTE ADULT - SUBJECTIVE AND OBJECTIVE BOX
Patient is a 59y old  Male who presents with a chief complaint of abdominal pain (07 Feb 2020 09:45)      HPI:  59M seen and examined in the ed who presented with complaints of epigastric pain and chest pain. Patient states he had done cocaine about 48 hours ago and his po intake in last 24 hour had been excessive. Patient states due to worsening epigastric discomfort he decided to come to the emergency room. Denies any nausea or vomiting currently but does claim to have epigastric discomfort. Denies any similar episodes in the past. No fevers or chills reported. Patient reports of having one episode of vomiting prior to coming to the hospital.     2/20: Pt seen and examined, doing well, no fever, tolerating diet, having GI function .    ICU Vital Signs Last 24 Hrs  T(C): 36.4 (02 Feb 2020 11:33), Max: 36.4 (02 Feb 2020 08:46)  T(F): 97.5 (02 Feb 2020 11:33), Max: 97.6 (02 Feb 2020 08:46)  HR: 75 (02 Feb 2020 11:33) (70 - 90)  BP: 156/104 (02 Feb 2020 11:33) (156/104 - 222/123)  BP(mean): 119 (02 Feb 2020 11:33) (119 - 149)  ABP: --  ABP(mean): --  RR: 16 (02 Feb 2020 11:33) (15 - 19)  SpO2: 100% (02 Feb 2020 11:33) (95% - 100%)                          14.3   4.18  )-----------( 220      ( 02 Feb 2020 08:45 )             42.9     02-02    139  |  106  |  14  ----------------------------<  105<H>  3.9   |  29  |  1.20    Ca    8.9      02 Feb 2020 08:45    TPro  7.4  /  Alb  3.6  /  TBili  0.2  /  DBili  x   /  AST  30  /  ALT  41  /  AlkPhos  84  02-02    Physical exam:     gen : ao x3  pulm: equal air entry bialterally  cv : [id number]  abdomen: soft, distended, tender to palpation in the epigastric area, no hernia,   old surgical scar visible in the left inguinal hernia (02 Feb 2020 13:28)    ROS:.  [X] A ten-point review of systems was otherwise negative except as noted.  Systemic:	[ ] Fever	[ ] Chills	[ ] Night sweats    [ ] Fatigue	[ ] Other  [] Cardiovascular:  [] Pulmonary:  [] Renal/Urologic:  [] Gastrointestinal: abdominal pain, vomiting  [] Metabolic:  [] Neurologic:  [] Hematologic:  [] ENT:  [] Ophthalmologic:  [] Musculoskeletal:    [ ] Due to altered mental status/intubation, subjective information were not able to be obtained from the patient. History was obtained, to the extent possible, from review of the chart and collateral sources of information.    All other system review is negative .  PAST MEDICAL & SURGICAL HISTORY:  HLD (hyperlipidemia)  HTN (hypertension)  History of back surgery    FAMILY HISTORY:    Social History:     Alcohol: Denied  Smoking: Denied  Drug Use: Denied        Vital Signs Last 24 Hrs  T(C): 37 (07 Feb 2020 10:51), Max: 37.2 (06 Feb 2020 20:49)  T(F): 98.6 (07 Feb 2020 10:51), Max: 98.9 (06 Feb 2020 20:49)  HR: 79 (07 Feb 2020 10:51) (63 - 98)  BP: 154/78 (07 Feb 2020 10:51) (150/61 - 184/82)  BP(mean): --  RR: 18 (07 Feb 2020 10:51) (18 - 18)  SpO2: 95% (07 Feb 2020 10:51) (94% - 100%)    I&O's Summary      PHYSICAL EXAM:  Constitutional: NAD, GCS: 15/15  AOX3  Eyes:  WNL  ENMT:  WNL  Neck:  WNL, non tender  Back: Non tender  Respiratory: CTABL  Cardiovascular:  S1+S2+0  Gastrointestinal: Soft, ND, incision CDI.  Genitourinary:  WNL  Extremities: NV intact  Vascular:  Intact  Neurological: No focal neurological deficit,  CN, motor and sensory system grossly intact.  Skin: WNL  Musculoskeletal: WNL  Psychiatric: Grossly WNL        Labs:

## 2020-02-07 NOTE — DISCHARGE NOTE NURSING/CASE MANAGEMENT/SOCIAL WORK - PATIENT PORTAL LINK FT
You can access the FollowMyHealth Patient Portal offered by [facility] by registering at the following website: [email or web address]. By joining [employer]’s FollowMyHealth portal, you will also be able to view your health information using other applications (apps) compatible with our system.

## 2020-02-07 NOTE — PROGRESS NOTE ADULT - NSHPATTENDINGPLANDISCUSS_GEN_ALL_CORE
patient, nurse
attending, Dr Rodriguez and nursing staff

## 2020-02-11 DIAGNOSIS — F14.19 COCAINE ABUSE WITH UNSPECIFIED COCAINE-INDUCED DISORDER: ICD-10-CM

## 2020-02-11 DIAGNOSIS — Z91.14 PATIENT'S OTHER NONCOMPLIANCE WITH MEDICATION REGIMEN: ICD-10-CM

## 2020-02-11 DIAGNOSIS — Z79.82 LONG TERM (CURRENT) USE OF ASPIRIN: ICD-10-CM

## 2020-02-11 DIAGNOSIS — E78.5 HYPERLIPIDEMIA, UNSPECIFIED: ICD-10-CM

## 2020-02-11 DIAGNOSIS — R94.31 ABNORMAL ELECTROCARDIOGRAM [ECG] [EKG]: ICD-10-CM

## 2020-02-11 DIAGNOSIS — Z79.899 OTHER LONG TERM (CURRENT) DRUG THERAPY: ICD-10-CM

## 2020-02-11 DIAGNOSIS — E87.2 ACIDOSIS: ICD-10-CM

## 2020-02-11 DIAGNOSIS — K56.50 INTESTINAL ADHESIONS [BANDS], UNSPECIFIED AS TO PARTIAL VERSUS COMPLETE OBSTRUCTION: ICD-10-CM

## 2020-02-11 DIAGNOSIS — I51.7 CARDIOMEGALY: ICD-10-CM

## 2020-02-11 DIAGNOSIS — I10 ESSENTIAL (PRIMARY) HYPERTENSION: ICD-10-CM

## 2020-02-12 ENCOUNTER — APPOINTMENT (OUTPATIENT)
Dept: SURGERY | Facility: CLINIC | Age: 60
End: 2020-02-12
Payer: MEDICAID

## 2020-02-12 VITALS
WEIGHT: 173 LBS | HEART RATE: 72 BPM | TEMPERATURE: 98 F | OXYGEN SATURATION: 98 % | SYSTOLIC BLOOD PRESSURE: 165 MMHG | BODY MASS INDEX: 25.62 KG/M2 | HEIGHT: 69 IN | DIASTOLIC BLOOD PRESSURE: 78 MMHG

## 2020-02-12 DIAGNOSIS — Z87.19 PERSONAL HISTORY OF OTHER DISEASES OF THE DIGESTIVE SYSTEM: ICD-10-CM

## 2020-02-12 DIAGNOSIS — K43.2 INCISIONAL HERNIA W/OUT OBSTRUCTION OR GANGRENE: ICD-10-CM

## 2020-02-12 DIAGNOSIS — K45.0 OTHER SPECIFIED ABDOMINAL HERNIA WITH OBSTRUCTION, W/OUT GANGRENE: ICD-10-CM

## 2020-02-12 DIAGNOSIS — Z87.898 PERSONAL HISTORY OF OTHER SPECIFIED CONDITIONS: ICD-10-CM

## 2020-02-12 PROCEDURE — 99024 POSTOP FOLLOW-UP VISIT: CPT

## 2021-03-23 NOTE — ED ADULT NURSE NOTE - NSSEPSISSUSPECTED_ED_A_ED
How Severe Is Your Skin Lesion?: severe Have Your Skin Lesions Been Treated?: not been treated Is This A New Presentation, Or A Follow-Up?: Skin Lesions No
